# Patient Record
Sex: FEMALE | Race: ASIAN | Employment: OTHER | URBAN - METROPOLITAN AREA
[De-identification: names, ages, dates, MRNs, and addresses within clinical notes are randomized per-mention and may not be internally consistent; named-entity substitution may affect disease eponyms.]

---

## 2017-01-24 DIAGNOSIS — J30.2 SEASONAL ALLERGIC RHINITIS: Primary | ICD-10-CM

## 2017-01-24 RX ORDER — HYDROCORTISONE VALERATE 2 MG/G
OINTMENT TOPICAL
Qty: 90 G | Refills: 1 | Status: SHIPPED | OUTPATIENT
Start: 2017-01-24 | End: 2017-10-09

## 2017-01-24 NOTE — TELEPHONE ENCOUNTER
hydrocortisone valerate (WESTCORT) 0.2 % ointment      Last Written Prescription Date: 11/25/2015  Last Fill Quantity: 90g,  # refills: prn   Last Office Visit with [facility], [facility] or [facility] prescribing provider: 5/04/2016

## 2017-02-02 DIAGNOSIS — J30.2 SEASONAL ALLERGIC RHINITIS: Primary | ICD-10-CM

## 2017-02-02 RX ORDER — LORATADINE 10 MG/1
10 TABLET ORAL DAILY
Qty: 90 TABLET | Refills: 0 | Status: SHIPPED | OUTPATIENT
Start: 2017-02-02 | End: 2017-06-12

## 2017-02-02 NOTE — TELEPHONE ENCOUNTER
risperiDONE (RISPERDAL) 0.5 MG tablet      Last Written Prescription Date: 7/29/2016  Last Fill Quantity: 90,  # refills: 1   Last Office Visit with [facility], [facility] or [facility] prescribing provider: 5/04/2016

## 2017-03-31 ENCOUNTER — MEDICAL CORRESPONDENCE (OUTPATIENT)
Dept: HEALTH INFORMATION MANAGEMENT | Facility: CLINIC | Age: 82
End: 2017-03-31

## 2017-03-31 ENCOUNTER — TELEPHONE (OUTPATIENT)
Dept: INTERNAL MEDICINE | Facility: CLINIC | Age: 82
End: 2017-03-31

## 2017-03-31 NOTE — TELEPHONE ENCOUNTER
Reason for call: Other   Patient called regarding (reason for call): prescription  Additional comments: RX for pull ups sent [phone] active style.    Phone Number Pt can be reached at: Other phone number:    Best Time: anytime  Can we leave a detailed message on this number? NO

## 2017-03-31 NOTE — TELEPHONE ENCOUNTER
Form filled out, placed in PCP's folder to be signed and faxed today.  Jeniffer Srinivasan RN       Formed signed by PCP and faxed  Jeniffer Srinivasan RN

## 2017-06-12 DIAGNOSIS — J30.2 SEASONAL ALLERGIC RHINITIS: ICD-10-CM

## 2017-06-12 NOTE — TELEPHONE ENCOUNTER
loratadine (CLARITIN) 10 MG tablet      Last Written Prescription Date: 2/2/17  Last Fill Quantity: 90,  # refills: 0  Last Office Visit with [facility], [facility] or [facility] prescribing provider: 5/4/16

## 2017-06-13 RX ORDER — LORATADINE 10 MG/1
TABLET ORAL
Qty: 30 TABLET | Refills: 0 | Status: SHIPPED | OUTPATIENT
Start: 2017-06-13 | End: 2017-07-13

## 2017-06-14 DIAGNOSIS — J30.2 SEASONAL ALLERGIC RHINITIS: Primary | ICD-10-CM

## 2017-06-14 DIAGNOSIS — I10 ESSENTIAL HYPERTENSION: ICD-10-CM

## 2017-06-14 RX ORDER — LOSARTAN POTASSIUM AND HYDROCHLOROTHIAZIDE 25; 100 MG/1; MG/1
1 TABLET ORAL DAILY
Qty: 30 TABLET | Refills: 0 | Status: SHIPPED | OUTPATIENT
Start: 2017-06-14 | End: 2017-07-13

## 2017-06-14 RX ORDER — FLUTICASONE PROPIONATE 50 MCG
1-2 SPRAY, SUSPENSION (ML) NASAL DAILY
Qty: 16 G | Refills: 0 | Status: SHIPPED | OUTPATIENT
Start: 2017-06-14 | End: 2017-07-13

## 2017-06-14 NOTE — TELEPHONE ENCOUNTER
fluticasone (FLONASE) 50 MCG/ACT nasal spray      Last Written Prescription Date: 1/27/2016  Last Fill Quantity: 16g,  # refills: 3   Last Office Visit with [facility], [facility] or [facility] prescribing provider: 5/04/2016

## 2017-06-14 NOTE — TELEPHONE ENCOUNTER
losartan-hydrochlorothiazide (HYZAAR) 100-25 MG per tablet      Last Written Prescription Date: 1/27/2016  Last Fill Quantity: 90, # refills: 3  Last Office Visit with [facility], [facility] or [facility] prescribing provider: 5/04/2016       Potassium   Date Value Ref Range Status   05/04/2016 3.6 3.4 - 5.3 mmol/L Final     Creatinine   Date Value Ref Range Status   05/04/2016 0.84 0.52 - 1.04 mg/dL Final     BP Readings from Last 3 Encounters:   05/04/16 124/62   02/13/16 178/80   01/27/16 130/70

## 2017-06-16 DIAGNOSIS — I10 ESSENTIAL HYPERTENSION: ICD-10-CM

## 2017-06-16 DIAGNOSIS — I10 HYPERTENSION GOAL BP (BLOOD PRESSURE) < 140/90: ICD-10-CM

## 2017-06-16 NOTE — TELEPHONE ENCOUNTER
amLODIPine      Last Written Prescription Date: 01/27/16  Last Fill Quantity: 90, # refills: 3    Last Office Visit with [facility] or [facility] prescribing provider:  01/27/16   Future Office Visit:        BP Readings from Last 3 Encounters:   05/04/16 124/62   02/13/16 178/80   01/27/16 130/70     Aspirin 81mg      Last Written Prescription Date: 01/27/2016  Last Fill Quantity: 90, # refills: 3  Last Office Visit with [facility], [facility] or [facility] prescribing provider: 01/27/16        BP Readings from Last 3 Encounters:   05/04/16 124/62   02/13/16 178/80   01/27/16 130/70     Lab Results   Component Value Date    AST 23 04/08/2013     Lab Results   Component Value Date    ALT 16 04/08/2013     Creatinine   Date Value Ref Range Status   05/04/2016 0.84 0.52 - 1.04 mg/dL Final

## 2017-06-20 RX ORDER — AMLODIPINE BESYLATE 5 MG/1
5 TABLET ORAL
Qty: 90 TABLET | Refills: 3 | Status: SHIPPED | OUTPATIENT
Start: 2017-06-20 | End: 2017-07-13

## 2017-06-22 DIAGNOSIS — I10 ESSENTIAL HYPERTENSION: ICD-10-CM

## 2017-06-22 RX ORDER — FLUTICASONE PROPIONATE 50 MCG
1-2 SPRAY, SUSPENSION (ML) NASAL DAILY
Qty: 16 G | Refills: 0 | Status: CANCELLED | OUTPATIENT
Start: 2017-06-22

## 2017-06-22 RX ORDER — LOSARTAN POTASSIUM AND HYDROCHLOROTHIAZIDE 25; 100 MG/1; MG/1
1 TABLET ORAL DAILY
Qty: 30 TABLET | Refills: 0 | Status: CANCELLED | OUTPATIENT
Start: 2017-06-22

## 2017-06-22 RX ORDER — LORATADINE 10 MG/1
TABLET ORAL
Qty: 30 TABLET | Refills: 0 | Status: CANCELLED | OUTPATIENT
Start: 2017-06-22

## 2017-06-22 NOTE — TELEPHONE ENCOUNTER
PATIENT HAS UPCOMING APPT AND IS NEEDING MEDS TO HOLD HER OVER TILL THEM    fluticasone (FLONASE) 50 MCG/ACT spray,  Last Written Prescription Date: 06/14/2017  Last Fill Quantity: 16g, # refills: 0    Last Office Visit with [facility], [facility] or  Health prescribing provider:  05/04/2016   Future Office Visit:    Next 5 appointments (look out 90 days)     Jul 13, 2017  9:30 AM CDT   [facility]   [facility]    [address] [place] [id number]   [phone]                   Date of Last Asthma Action Plan Letter:   There are no preventive care reminders to display for this patient.   Asthma Control Test: No flowsheet data found.    Date of Last Spirometry Test:   No results found for this or any previous visit.       losartan-hydrochlorothiazide (HYZAAR) 100-25 MG per tablet      Last Written Prescription Date: 06/14/2017  Last Fill Quantity: 30,  # refills: 0   Last Office Visit with [facility], [facility] or  Health prescribing provider: 05/04/2016                                         Next 5 appointments (look out 90 days)     Jul 13, 2017  9:30 AM CDT   SHORT with Mario Wilhelm MD   [facility] ([address] [address]   [phone]                    loratadine (CLARITIN) 10 MG tablet       Last Written Prescription Date: 06/13/2017  Last Fill Quantity: 30, # refills: 0  Last Office Visit with [facility], [facility] or  Health prescribing provider: 05/04/2016  Next 5 appointments (look out 90 days)     Jul 13, 2017  9:30 AM CDT   SHORT with [facility] Wilhelm MD   [facility]    [address] [place] [id number] [phone]                   Potassium   Date Value Ref Range Status   05/04/2016 3.6 3.4 - 5.3 mmol/L Final     Creatinine   Date Value Ref Range Status    05/04/2016 0.84 0.52 - 1.04 mg/dL Final     BP Readings from Last 3 Encounters:   05/04/16 124/62   02/13/16 178/80   01/27/16 130/70

## 2017-06-22 NOTE — TELEPHONE ENCOUNTER
Called , Pam, on consent to communicate. Patient was already given refill of these on 6/14, which should be enough to last until upcoming appointment. She will check with patient's son and call back if any issues. Per pharmacy, patient did  these prescriptions on 6/14.

## 2017-07-13 ENCOUNTER — OFFICE VISIT (OUTPATIENT)
Dept: INTERNAL MEDICINE | Facility: CLINIC | Age: 82
End: 2017-07-13
Payer: COMMERCIAL

## 2017-07-13 VITALS
TEMPERATURE: 98.3 F | WEIGHT: 142.3 LBS | HEART RATE: 81 BPM | OXYGEN SATURATION: 99 % | SYSTOLIC BLOOD PRESSURE: 148 MMHG | BODY MASS INDEX: 29.87 KG/M2 | HEIGHT: 58 IN | DIASTOLIC BLOOD PRESSURE: 62 MMHG

## 2017-07-13 DIAGNOSIS — Z00.00 MEDICARE ANNUAL WELLNESS VISIT, INITIAL: Primary | ICD-10-CM

## 2017-07-13 DIAGNOSIS — J30.2 SEASONAL ALLERGIC RHINITIS, UNSPECIFIED CHRONICITY, UNSPECIFIED TRIGGER: ICD-10-CM

## 2017-07-13 DIAGNOSIS — I10 ESSENTIAL HYPERTENSION: ICD-10-CM

## 2017-07-13 DIAGNOSIS — Z23 ENCOUNTER FOR IMMUNIZATION: ICD-10-CM

## 2017-07-13 LAB
ANION GAP SERPL CALCULATED.3IONS-SCNC: 7 MMOL/L (ref 3–14)
BUN SERPL-MCNC: 12 MG/DL (ref 7–30)
CALCIUM SERPL-MCNC: 9.6 MG/DL (ref 8.5–10.1)
CHLORIDE SERPL-SCNC: 110 MMOL/L (ref 94–109)
CO2 SERPL-SCNC: 27 MMOL/L (ref 20–32)
CREAT SERPL-MCNC: 0.7 MG/DL (ref 0.52–1.04)
GFR SERPL CREATININE-BSD FRML MDRD: 78 ML/MIN/1.7M2
GLUCOSE SERPL-MCNC: 101 MG/DL (ref 70–99)
HGB BLD-MCNC: 7.6 G/DL (ref 11.7–15.7)
POTASSIUM SERPL-SCNC: 4.4 MMOL/L (ref 3.4–5.3)
SODIUM SERPL-SCNC: 144 MMOL/L (ref 133–144)

## 2017-07-13 PROCEDURE — 80048 BASIC METABOLIC PNL TOTAL CA: CPT | Performed by: INTERNAL MEDICINE

## 2017-07-13 PROCEDURE — 85018 HEMOGLOBIN: CPT | Performed by: INTERNAL MEDICINE

## 2017-07-13 PROCEDURE — 36415 COLL VENOUS BLD VENIPUNCTURE: CPT | Performed by: INTERNAL MEDICINE

## 2017-07-13 PROCEDURE — G0438 PPPS, INITIAL VISIT: HCPCS | Mod: 25 | Performed by: INTERNAL MEDICINE

## 2017-07-13 PROCEDURE — 90670 PCV13 VACCINE IM: CPT | Performed by: INTERNAL MEDICINE

## 2017-07-13 PROCEDURE — G0009 ADMIN PNEUMOCOCCAL VACCINE: HCPCS | Performed by: INTERNAL MEDICINE

## 2017-07-13 RX ORDER — LOSARTAN POTASSIUM AND HYDROCHLOROTHIAZIDE 25; 100 MG/1; MG/1
1 TABLET ORAL DAILY
Qty: 90 TABLET | Refills: 3 | Status: SHIPPED | OUTPATIENT
Start: 2017-07-13 | End: 2018-08-08

## 2017-07-13 RX ORDER — FLUTICASONE PROPIONATE 50 MCG
1-2 SPRAY, SUSPENSION (ML) NASAL DAILY
Qty: 16 G | Refills: 11 | Status: SHIPPED | OUTPATIENT
Start: 2017-07-13 | End: 2018-08-08

## 2017-07-13 RX ORDER — AMLODIPINE BESYLATE 5 MG/1
5 TABLET ORAL
Qty: 90 TABLET | Refills: 3 | Status: SHIPPED | OUTPATIENT
Start: 2017-07-13 | End: 2019-04-01

## 2017-07-13 RX ORDER — LORATADINE 10 MG/1
TABLET ORAL
Qty: 90 TABLET | Refills: 3 | Status: SHIPPED | OUTPATIENT
Start: 2017-07-13 | End: 2018-08-08

## 2017-07-13 NOTE — NURSING NOTE
"Chief Complaint   Patient presents with     Physical       Initial /62  Pulse 81  Temp 98.3  F (36.8  C) (Oral)  Ht 4' 10\" (1.473 m)  Wt 142 lb 4.8 oz (64.5 kg)  SpO2 99%  BMI 29.74 kg/m2 Estimated body mass index is 29.74 kg/(m^2) as calculated from the following:    Height as of this encounter: 4' 10\" (1.473 m).    Weight as of this encounter: 142 lb 4.8 oz (64.5 kg).  Medication Reconciliation: complete   Leidy Huitron MA   "

## 2017-07-13 NOTE — LETTER
[address]  [address]  [phone]          Margot Kenyon  [address]  [facility] [address]      7/14/2017        Dear MsJay Jay Kenyon:    I am writing to inform you of the results of the laboratory tests you had done recently.    The results of your recent labs are as noted.   Kidney function:   NORMAL  Hemoglobin:   ABNORMAL slightly decreased from before     Please follow-up with me in the next few weeks to discuss your reduced hemoglobin.      Thank you for allowing me to participate in your care. If you have any further questions or problems, please contact me via our nurse line at [phone].    Sincerely,          Britton Wilhelm M.D.  Department of Internal Medicine  [facility]

## 2017-07-13 NOTE — PROGRESS NOTES
SUBJECTIVE:   Margot Kenyon is a 89 year old female who presents for Preventive Visit.    Are you in the first 12 months of your Medicare Part B coverage?  No    Healthy Habits:    Do you get at least three servings of calcium containing foods daily (dairy, green leafy vegetables, etc.)?  no, taking calcium and/or vitamin D supplement: yes - Tums    Amount of exercise or daily activities, outside of work: none    Problems taking medications regularly No    Medication side effects: No    Have you had an eye exam in the past two years? yes    Do you see a dentist twice per year? No, has dentures     Do you have sleep apnea, excessive snoring or daytime drowsiness?no    COGNITIVE SCREEN-refused per son-patient not able to do             Reviewed and updated as needed this visit by clinical staff  Tobacco  Allergies  Meds  Problems         Reviewed and updated as needed this visit by Provider  Allergies  Meds  Problems        Social History   Substance Use Topics     Smoking status: Never Smoker     Smokeless tobacco: Never Used     Alcohol use No       The patient does not drink >3 drinks per day nor >7 drinks per week.    Today's PHQ-2 Score:   PHQ-2 ( 1999 Pf[employer]) 7/13/2017 1/27/2016   Q1: Little interest or pleasure in doing things 0 0   Q2: Feeling down, depressed or hopeless 0 0   PHQ-2 Score 0 0       Do you feel safe in your environment - YES    Do you have a Health Care Directive?: No: Advance care planning reviewed with patient; information given to patient to review.    Current providers sharing in care for this patient include:   Patient Care Team:  Mario Wilhelm MD as PCP - General (Internal Medicine)      Hearing impairment: Yes, Feel that people are mumbling or not speaking clearly.    Need to ask people to speak up or repeat themselves.    Ability to successfully perform activities of daily living: No, needs assistance with: transportation, preparing meals, housework, bathing and medications  "    Fall risk:  Fallen 2 or more times in the past year?: No  Any fall with injury in the past year?: No    Home safety:  none identified      The following health maintenance items are reviewed in Epic and correct as of today:  Health Maintenance   Topic Date Due     PNEUMOCOCCAL (2 of 2 - PCV13) 05/26/2012     DEXA Q3 YR  07/27/2013     FALL RISK ASSESSMENT  01/27/2017     INFLUENZA VACCINE (SYSTEM ASSIGNED)  09/01/2017     ADVANCE DIRECTIVE PLANNING Q5 YRS  11/13/2019     TETANUS IMMUNIZATION (SYSTEM ASSIGNED)  05/26/2021         Pneumonia Vaccine:Adults age 65+ who received Pneumovax (PPSV23) at 65 years or older: Should be given PCV13 > 1 year after their most recent PPSV23      Patient continues on amlodipine and losartan/HCTZ for hypertension.  Today's blood pressure noted, apart from mild dry mouth she seems to tolerate the medications without difficulty.  Continues on loratadine and occasional nasal steroid spray, needs refills of medications.    ROS:  Constitutional, HEENT, cardiovascular, pulmonary, GI, , musculoskeletal, neuro, skin, endocrine and psych systems are negative, except as otherwise noted.    OBJECTIVE:   /62  Pulse 81  Temp 98.3  F (36.8  C) (Oral)  Ht 4' 10\" (1.473 m)  Wt 142 lb 4.8 oz (64.5 kg)  SpO2 99%  BMI 29.74 kg/m2 Estimated body mass index is 29.74 kg/(m^2) as calculated from the following:    Height as of this encounter: 4' 10\" (1.473 m).    Weight as of this encounter: 142 lb 4.8 oz (64.5 kg).  EXAM:   GENERAL: healthy, alert and no distress  NECK: no adenopathy, no asymmetry, masses, or scars and thyroid normal to palpation  RESP: lungs clear to auscultation - no rales, rhonchi or wheezes  CV: regular rate and rhythm, normal S1 S2, no S3 or S4, no murmur, click or rub, no peripheral edema and peripheral pulses strong  ABDOMEN: soft, nontender, no hepatosplenomegaly, no masses and bowel sounds normal  MS: no gross musculoskeletal defects noted, no edema    ASSESSMENT " "/ PLAN:   1. Medicare annual wellness visit, initial      2. Essential hypertension  Reasonably well-controlled, check basic surveillance labs today  - amLODIPine (NORVASC) 5 MG tablet; Take 1 tablet (5 mg) by mouth daily (with breakfast)  Dispense: 90 tablet; Refill: 3  - losartan-hydrochlorothiazide (HYZAAR) 100-25 MG per tablet; Take 1 tablet by mouth daily  Dispense: 90 tablet; Refill: 3  - Basic metabolic panel  (Ca, Cl, CO2, Creat, Gluc, K, Na, BUN)  - Hemoglobin    3. Seasonal allergic rhinitis, unspecified chronicity, unspecified trigger  Meds refilled as ordered  - fluticasone (FLONASE) 50 MCG/ACT spray; Spray 1-2 sprays into both nostrils daily  Dispense: 16 g; Refill: 11  - loratadine (CLARITIN) 10 MG tablet; TAKE 1 TABLET(10 MG) BY MOUTH DAILY  Dispense: 90 tablet; Refill: 3    4. Encounter for immunization    - Pneumococcal vaccine 13 valent PCV13 IM (Prevnar) [[id number]]  - ADMIN MEDICARE: Pneumococcal Vaccine ()    End of Life Planning:  Patient currently has an advanced directive: No.  I have verified the patient's ablity to prepare an advanced directive/make health care decisions.  Literature was provided to assist patient in preparing an advanced directive.    COUNSELING:  Reviewed preventive health counseling, as reflected in patient instructions        Estimated body mass index is 29.74 kg/(m^2) as calculated from the following:    Height as of this encounter: 4' 10\" (1.473 m).    Weight as of this encounter: 142 lb 4.8 oz (64.5 kg).     reports that she has never smoked. She has never used smokeless tobacco.      Appropriate preventive services were discussed with this patient, including applicable screening as appropriate for cardiovascular disease, diabetes, osteopenia/osteoporosis, and glaucoma.  As appropriate for age/gender, discussed screening for colorectal cancer, prostate cancer, breast cancer, and cervical cancer. Checklist reviewing preventive services available has been given to the " patient.    Reviewed patients plan of care and provided an AVS. The Basic Care Plan (routine screening as documented in Health Maintenance) for So meets the Care Plan requirement. This Care Plan has been established and reviewed with the Patient.    Counseling Resources:  ATP IV Guidelines  Pooled Cohorts Equation Calculator  Breast Cancer Risk Calculator  FRAX Risk Assessment  ICSI Preventive Guidelines  Dietary Guidelines for Americans, 2010  USDA's MyPlate  ASA Prophylaxis  Lung CA Screening    Mario Wilhelm MD  [facility]

## 2017-07-13 NOTE — MR AVS SNAPSHOT
After Visit Summary   7/13/2017    Margot Kenyon    MRN: [id number]           Patient Information     Date Of Birth          8/20/1920        Visit Information        Provider Department      7/13/2017 9:15 AM Mario Wilhelm MD; [employer] [facility]        Today's Diagnoses     Medicare annual wellness visit, initial    -  1    Essential hypertension        Seasonal allergic rhinitis, unspecified chronicity, unspecified trigger        Encounter for immunization          Care Instructions      Preventive Health Recommendations    Female Ages 65 +    Yearly exam:     See your health care provider every year in order to  o Review health changes.   o Discuss preventive care.    o Review your medicines if your doctor has prescribed any.      You no longer need a yearly Pap test unless you've had an abnormal Pap test in the past 10 years. If you have vaginal symptoms, such as bleeding or discharge, be sure to talk with your provider about a Pap test.      Every 1 to 2 years, have a mammogram.  If you are over 69, talk with your health care provider about whether or not you want to continue having screening mammograms.      Every 10 years, have a colonoscopy. Or, have a yearly FIT test (stool test). These exams will check for colon cancer.       Have a cholesterol test every 5 years, or more often if your doctor advises it.       Have a diabetes test (fasting glucose) every three years. If you are at risk for diabetes, you should have this test more often.       At age 65, have a bone density scan (DEXA) to check for osteoporosis (brittle bone disease).    Shots:    Get a flu shot each year.    Get a tetanus shot every 10 years.    Talk to your doctor about your pneumonia vaccines. There are now two you should receive - Pneumovax (PPSV 23) and Prevnar (PCV 13).    Talk to your doctor about the shingles vaccine.    Talk to your doctor about the hepatitis B  "vaccine.    Nutrition:     Eat at least 5 servings of fruits and vegetables each day.      Eat whole-grain bread, whole-wheat pasta and brown rice instead of white grains and rice.      Talk to your provider about Calcium and Vitamin D.     Lifestyle    Exercise at least 150 minutes a week (30 minutes a day, 5 days a week). This will help you control your weight and prevent disease.      Limit alcohol to one drink per day.      No smoking.       Wear sunscreen to prevent skin cancer.       See your dentist twice a year for an exam and cleaning.      See your eye doctor every 1 to 2 years to screen for conditions such as glaucoma, macular degeneration and cataracts.          Follow-ups after your visit        Who to contact     If you have questions or need follow up information about today's clinic visit or your schedule please contact [facility] directly at [phone].  Normal or non-critical lab and imaging results will be communicated to you by Wheelyhart, letter or phone within 4 business days after the clinic has received the results. If you do not hear from us within 7 days, please contact the clinic through [employer] or phone. If you have a critical or abnormal lab result, we will notify you by phone as soon as possible.  Submit refill requests through [employer] or call your pharmacy and they will forward the refill request to us. Please allow 3 business days for your refill to be completed.          Additional Information About Your Visit        [employer] Information     [employer] lets you send messages to your doctor, view your test results, renew your prescriptions, schedule appointments and more. To sign up, go to [email or web address] . Click on \"Log in\" on the left side of the screen, which will take you to the Welcome page. Then click on \"Sign up Now\" on the right side of the page.     You will be asked to enter the access code listed below, as well as some personal information. " "Please follow the directions to create your username and password.     Your access code is: [email or web address]  Expires: 10/11/2017 10:03 AM     Your access code will  in 90 days. If you need help or a new code, please call your [facility] clinic or [phone].        Care EveryWhere ID     This is your Care EveryWhere ID. This could be used by other organizations to access your [facility] medical records  [id number]        Your Vitals Were     Pulse Temperature Height Pulse Oximetry BMI (Body Mass Index)       81 98.3  F (36.8  C) (Oral) 4' 10\" (1.473 m) 99% 29.74 kg/m2        Blood Pressure from Last 3 Encounters:   17 148/62   16 124/62   16 178/80    Weight from Last 3 Encounters:   17 142 lb 4.8 oz (64.5 kg)   16 137 lb (62.1 kg)   16 135 lb (61.2 kg)              We Performed the Following     ADMIN MEDICARE: Pneumococcal Vaccine ()     Basic metabolic panel  (Ca, Cl, CO2, Creat, Gluc, K, Na, BUN)     Hemoglobin     Pneumococcal vaccine 13 valent PCV13 IM (Prevnar) [[id number]]          Today's Medication Changes          These changes are accurate as of: 17 10:03 AM.  If you have any questions, ask your nurse or doctor.               These medicines have changed or have updated prescriptions.        Dose/Directions    loratadine 10 MG tablet   Commonly known as:  CLARITIN   This may have changed:  See the new instructions.   Used for:  Seasonal allergic rhinitis, unspecified chronicity, unspecified trigger   Changed by:  Mario Wilhelm MD        TAKE 1 TABLET(10 MG) BY MOUTH DAILY   Quantity:  90 tablet   Refills:  3         Stop taking these medicines if you haven't already. Please contact your care team if you have questions.     meclizine 25 MG tablet   Commonly known as:  ANTIVERT   Stopped by:  Mario Wilhelm MD                Where to get your medicines      These medications were sent to [facility] [id number] [place] [address] [address] [facility] [address]  [address], [facility] [address]    Hours:  24-hours Phone:  [phone]     amLODIPine 5 MG tablet    fluticasone 50 MCG/ACT spray    loratadine 10 MG tablet    losartan-hydrochlorothiazide 100-25 MG per tablet                Primary Care Provider Office Phone # Fax #    Mario Britton Wilhelm [phone]       [facility] [address]  [facility] [address]        Equal Access to Services     ART SHARP : Hadii aad ku hadasho Soomaali, waaxda luqadaha, qaybta kaalmada adeegyada, waxay idiin hayaan adeeg kharameek ladeyanira saavedra. So [facility] [phone].    ATENCIÓN: Si habla español, tiene a diaz disposición servicios gratuitos de asistencia lingüística. [facility] [phone].    We comply with applicable federal civil rights laws and [place] laws. We do not discriminate on the basis of race, color, national origin, age, disability sex, sexual orientation or gender identity.            Thank you!     Thank you for choosing [facility]  for your care. Our goal is always to provide you with excellent care. Hearing back from our patients is one way we can continue to improve our services. Please take a few minutes to complete the written survey that you may receive in the mail after your visit with us. Thank you!             Your Updated Medication List - Protect others around you: Learn how to safely use, store and throw away your medicines at www.disposemymeds.org.          This list is accurate as of: 7/13/17 10:03 AM.  Always use your most recent med list.                   Brand Name Dispense Instructions for use Diagnosis    amLODIPine 5 MG tablet    NORVASC    90 tablet    Take 1 tablet (5 mg) by mouth daily (with breakfast)    Essential hypertension       aspirin 81 MG tablet     100 tablet    Take 1 tablet (81 mg) by mouth daily    Hypertension goal BP (blood pressure) < 140/90       capsaicin 0.075 % cream    TRIXAICIN HP     180 g    Apply topically 4 times daily    Pain in joint, multiple sites       fluticasone 50 MCG/ACT spray    FLONASE    16 g    Spray 1-2 sprays into both nostrils daily    Seasonal allergic rhinitis, unspecified chronicity, unspecified trigger       hydrocortisone valerate 0.2 % ointment    WESTCORT    90 g    Apply  topically 2 times daily.    Seasonal allergic rhinitis       loratadine 10 MG tablet    CLARITIN    90 tablet    TAKE 1 TABLET(10 MG) BY MOUTH DAILY    Seasonal allergic rhinitis, unspecified chronicity, unspecified trigger       losartan-hydrochlorothiazide 100-25 MG per tablet    HYZAAR    90 tablet    Take 1 tablet by mouth daily    Essential hypertension

## 2017-07-13 NOTE — LETTER
[address]  [address]  [phone]          Margot Kenyon  [address]  [facility] [address]      7/14/2017        Dear Ms. Kenyon:    I am writing to inform you of the results of the laboratory tests you had done recently.    The results of your recent labs are as noted.   {KTW LAB RESULT:[id number]}      Thank you for allowing me to participate in your care. If you have any further questions or problems, please contact me via our nurse line at [phone].    Sincerely,          Britton Wilhelm M.D.  Department of Internal Medicine  [facility]      {:[id number]}

## 2017-09-13 ENCOUNTER — OFFICE VISIT (OUTPATIENT)
Dept: INTERNAL MEDICINE | Facility: CLINIC | Age: 82
End: 2017-09-13
Payer: COMMERCIAL

## 2017-09-13 VITALS
DIASTOLIC BLOOD PRESSURE: 66 MMHG | BODY MASS INDEX: 29.41 KG/M2 | OXYGEN SATURATION: 97 % | WEIGHT: 140.1 LBS | HEART RATE: 79 BPM | HEIGHT: 58 IN | TEMPERATURE: 97.7 F | SYSTOLIC BLOOD PRESSURE: 148 MMHG

## 2017-09-13 DIAGNOSIS — D64.9 ANEMIA, UNSPECIFIED TYPE: Primary | ICD-10-CM

## 2017-09-13 DIAGNOSIS — I10 ESSENTIAL HYPERTENSION: ICD-10-CM

## 2017-09-13 LAB
BASOPHILS # BLD AUTO: 0 10E9/L (ref 0–0.2)
BASOPHILS NFR BLD AUTO: 0.2 %
DIFFERENTIAL METHOD BLD: ABNORMAL
EOSINOPHIL # BLD AUTO: 0.2 10E9/L (ref 0–0.7)
EOSINOPHIL NFR BLD AUTO: 4.8 %
ERYTHROCYTE [DISTWIDTH] IN BLOOD BY AUTOMATED COUNT: 17.6 % (ref 10–15)
FERRITIN SERPL-MCNC: 8 NG/ML (ref 8–252)
FOLATE SERPL-MCNC: 15.4 NG/ML
HCT VFR BLD AUTO: 28.9 % (ref 35–47)
HGB BLD-MCNC: 7.7 G/DL (ref 11.7–15.7)
IRON SATN MFR SERPL: 4 % (ref 15–46)
IRON SERPL-MCNC: 20 UG/DL (ref 35–180)
LYMPHOCYTES # BLD AUTO: 0.8 10E9/L (ref 0.8–5.3)
LYMPHOCYTES NFR BLD AUTO: 16.7 %
MCH RBC QN AUTO: 22.4 PG (ref 26.5–33)
MCHC RBC AUTO-ENTMCNC: 26.6 G/DL (ref 31.5–36.5)
MCV RBC AUTO: 84 FL (ref 78–100)
MONOCYTES # BLD AUTO: 0.5 10E9/L (ref 0–1.3)
MONOCYTES NFR BLD AUTO: 11.3 %
NEUTROPHILS # BLD AUTO: 3.1 10E9/L (ref 1.6–8.3)
NEUTROPHILS NFR BLD AUTO: 67 %
PLATELET # BLD AUTO: 271 10E9/L (ref 150–450)
RBC # BLD AUTO: 3.43 10E12/L (ref 3.8–5.2)
TIBC SERPL-MCNC: 456 UG/DL (ref 240–430)
VIT B12 SERPL-MCNC: 256 PG/ML (ref 193–986)
WBC # BLD AUTO: 4.6 10E9/L (ref 4–11)

## 2017-09-13 PROCEDURE — 36415 COLL VENOUS BLD VENIPUNCTURE: CPT | Performed by: INTERNAL MEDICINE

## 2017-09-13 PROCEDURE — 83550 IRON BINDING TEST: CPT | Performed by: INTERNAL MEDICINE

## 2017-09-13 PROCEDURE — 82746 ASSAY OF FOLIC ACID SERUM: CPT | Performed by: INTERNAL MEDICINE

## 2017-09-13 PROCEDURE — 82607 VITAMIN B-12: CPT | Performed by: INTERNAL MEDICINE

## 2017-09-13 PROCEDURE — 83540 ASSAY OF IRON: CPT | Performed by: INTERNAL MEDICINE

## 2017-09-13 PROCEDURE — 82728 ASSAY OF FERRITIN: CPT | Performed by: INTERNAL MEDICINE

## 2017-09-13 PROCEDURE — 99214 OFFICE O/P EST MOD 30 MIN: CPT | Performed by: INTERNAL MEDICINE

## 2017-09-13 PROCEDURE — 85025 COMPLETE CBC W/AUTO DIFF WBC: CPT | Performed by: INTERNAL MEDICINE

## 2017-09-13 NOTE — PROGRESS NOTES
"  SUBJECTIVE:   So GLO Kenyon is a 89 year old female who presents to clinic today for the following health issues:      Pt is here today with son and  to discuss labs from 07/13. No others concerns. Pt already had flu shot at adult .         Problem list and histories reviewed & adjusted, as indicated.  Additional history: as documented    Screening labs from wellness exam 2 months ago remarkable for hemoglobin of 7.6.  Was 11.5 as of November 2014.    Patient does endorse mild fatigue, and has some age-related physical debility, but otherwise is in excellent shape given her age.  She denies any significantly worsening shortness of breath, denies obvious hematochezia or melena.  She does not take any iron or vitamin supplementation, does take a baby aspirin every day.    On direct questioning, she would not be interested in GI endoscopic evaluation of this issue.    Reviewed and updated as needed this visit by clinical staff  Tobacco  Allergies       Reviewed and updated as needed this visit by Provider         ROS:  Constitutional, HEENT, cardiovascular, pulmonary, GI, , musculoskeletal, neuro, skin, endocrine and psych systems are negative, except as otherwise noted.      OBJECTIVE:   /66 (BP Location: Right arm, Patient Position: Chair, Cuff Size: Adult Regular)  Pulse 79  Temp 97.7  F (36.5  C) (Oral)  Ht 4' 10\" (1.473 m)  Wt 140 lb 1.6 oz (63.5 kg)  SpO2 97%  BMI 29.28 kg/m2  Body mass index is 29.28 kg/(m^2).  GENERAL: healthy, alert and no distress  NECK: no adenopathy, no asymmetry, masses, or scars and thyroid normal to palpation  RESP: lungs clear to auscultation - no rales, rhonchi or wheezes  CV: regular rate and rhythm, normal S1 S2, no S3 or S4, no murmur, click or rub, no peripheral edema and peripheral pulses strong  ABDOMEN: soft, nontender, no hepatosplenomegaly, no masses and bowel sounds normal  MS: no gross musculoskeletal defects noted, no " edema        ASSESSMENT/PLAN:     1. Anemia, unspecified type  Unclear etiology at this point.  No history to suggest ongoing rapid blood loss.  Will check labs as ordered, start iron replacement if necessary.  As above, patient and her son are not interested in pursuing GI endoscopic evaluation.  - CBC with platelets and differential  - Iron and iron binding capacity  - Ferritin  - Vitamin B12  - Folate    2. Essential hypertension  Stable.  Continue antihypertensives as currently.          Mario Wilhelm MD  [facility]

## 2017-09-13 NOTE — LETTER
9/28/2017         Margot Kenyon  [address]  [place] [facility] [place] [address]            Dear Ms. Kenyon,    I am writing to inform you of the lab tests you had performed recently.      Your lab results are as follows:    Hemoglobin: ABNORMAL - still low  Iron Levels: LOW    Please follow-up with me in the next few weeks to discuss this further.    Thank you for allowing me to participate in your care.  If you have further questions, please contact us at [phone].      Sincerely,        HIGINIO Wilhelm MD  Dept. of Internal Medicine  [facility]

## 2017-09-13 NOTE — NURSING NOTE
"Chief Complaint   Patient presents with     Results     from 07/13       Initial /66 (BP Location: Right arm, Patient Position: Chair, Cuff Size: Adult Regular)  Pulse 79  Temp 97.7  F (36.5  C) (Oral)  Ht 4' 10\" (1.473 m)  Wt 140 lb 1.6 oz (63.5 kg)  SpO2 97%  BMI 29.28 kg/m2 Estimated body mass index is 29.28 kg/(m^2) as calculated from the following:    Height as of this encounter: 4' 10\" (1.473 m).    Weight as of this encounter: 140 lb 1.6 oz (63.5 kg).  Medication Reconciliation: complete    "

## 2017-09-13 NOTE — MR AVS SNAPSHOT
"              After Visit Summary   2017    Margot Kenyon    MRN: [id number]           Patient Information     Date Of Birth          1920        Visit Information        Provider Department      2017 8:00 AM Mario Wilhelm MD; [employer] [facility]        Today's Diagnoses     Anemia, unspecified type    -  1    Essential hypertension           Follow-ups after your visit        Who to contact     If you have questions or need follow up information about today's clinic visit or your schedule please contact [facility] directly at [phone].  Normal or non-critical lab and imaging results will be communicated to you by MyChart, letter or phone within 4 business days after the clinic has received the results. If you do not hear from us within 7 days, please contact the clinic through [employer] or phone. If you have a critical or abnormal lab result, we will notify you by phone as soon as possible.  Submit refill requests through [facility] or call your pharmacy and they will forward the refill request to us. Please allow 3 business days for your refill to be completed.          Additional Information About Your Visit        MyChart Information     [facility] lets you send messages to your doctor, view your test results, renew your prescriptions, schedule appointments and more. To sign up, go to [email or web address] . Click on \"Log in\" on the left side of the screen, which will take you to the Welcome page. Then click on \"Sign up Now\" on the right side of the page.     You will be asked to enter the access code listed below, as well as some personal information. Please follow the directions to create your username and password.     Your access code is: [email or web address]  Expires: 10/11/2017 10:03 AM     Your access code will  in 90 days. If you need help or a new code, please call your [facility] or [phone].        Care " "EveryWhere ID     This is your Care EveryWhere ID. This could be used by other organizations to access your [place] medical records  [id number]        Your Vitals Were     Pulse Temperature Height Pulse Oximetry BMI (Body Mass Index)       79 97.7  F (36.5  C) (Oral) 4' 10\" (1.473 m) 97% 29.28 kg/m2        Blood Pressure from Last 3 Encounters:   09/13/17 148/66   07/13/17 148/62   05/04/16 124/62    Weight from Last 3 Encounters:   09/13/17 140 lb 1.6 oz (63.5 kg)   07/13/17 142 lb 4.8 oz (64.5 kg)   05/04/16 137 lb (62.1 kg)              We Performed the Following     CBC with platelets and differential     Ferritin     Folate     Iron and iron binding capacity     Vitamin B12        Primary Care Provider Office Phone # Fax #    Mario Wilhelm [phone]       [address] [address]        Equal Access to Services     ART SHARP : Hadii aad ku hadasho Soomaali, waaxda luqadaha, qaybta kaalmada adeegyada, waxay idiin hayaan radha hernadez . So [facility] [phone].    ATENCIÓN: Si habla español, tiene a diaz disposición servicios gratuitos de asistencia lingüística. [facility] [phone].    We comply with applicable federal civil rights laws and [place] laws. We do not discriminate on the basis of race, color, national origin, age, disability sex, sexual orientation or gender identity.            Thank you!     Thank you for choosing [facility]  for your care. Our goal is always to provide you with excellent care. Hearing back from our patients is one way we can continue to improve our services. Please take a few minutes to complete the written survey that you may receive in the mail after your visit with us. Thank you!             Your Updated Medication List - Protect others around you: Learn how to safely use, store and throw away your medicines at www.disposemymeds.org.          This list is accurate as of: 9/13/17  8:37 AM.  Always use " your most recent med list.                   Brand Name Dispense Instructions for use Diagnosis    amLODIPine 5 MG tablet    NORVASC    90 tablet    Take 1 tablet (5 mg) by mouth daily (with breakfast)    Essential hypertension       aspirin 81 MG tablet     100 tablet    Take 1 tablet (81 mg) by mouth daily    Hypertension goal BP (blood pressure) < 140/90       capsaicin 0.075 % cream    TRIXAICIN HP    180 g    Apply topically 4 times daily    Pain in joint, multiple sites       fluticasone 50 MCG/ACT spray    FLONASE    16 g    Spray 1-2 sprays into both nostrils daily    Seasonal allergic rhinitis, unspecified chronicity, unspecified trigger       hydrocortisone valerate 0.2 % ointment    WESTCORT    90 g    Apply  topically 2 times daily.    Seasonal allergic rhinitis       loratadine 10 MG tablet    CLARITIN    90 tablet    TAKE 1 TABLET(10 MG) BY MOUTH DAILY    Seasonal allergic rhinitis, unspecified chronicity, unspecified trigger       losartan-hydrochlorothiazide 100-25 MG per tablet    HYZAAR    90 tablet    Take 1 tablet by mouth daily    Essential hypertension

## 2017-10-09 DIAGNOSIS — J30.2 SEASONAL ALLERGIC RHINITIS: ICD-10-CM

## 2017-10-10 RX ORDER — HYDROCORTISONE VALERATE 2 MG/G
OINTMENT TOPICAL
Qty: 90 G | Refills: 11 | Status: SHIPPED | OUTPATIENT
Start: 2017-10-10 | End: 2018-11-30

## 2017-10-16 ENCOUNTER — OFFICE VISIT (OUTPATIENT)
Dept: INTERNAL MEDICINE | Facility: CLINIC | Age: 82
End: 2017-10-16
Payer: COMMERCIAL

## 2017-10-16 VITALS
DIASTOLIC BLOOD PRESSURE: 74 MMHG | TEMPERATURE: 97.7 F | HEART RATE: 84 BPM | OXYGEN SATURATION: 98 % | SYSTOLIC BLOOD PRESSURE: 152 MMHG | RESPIRATION RATE: 20 BRPM | BODY MASS INDEX: 30.1 KG/M2 | WEIGHT: 144 LBS

## 2017-10-16 DIAGNOSIS — D50.9 IRON DEFICIENCY ANEMIA, UNSPECIFIED IRON DEFICIENCY ANEMIA TYPE: Primary | ICD-10-CM

## 2017-10-16 DIAGNOSIS — M25.50 PAIN IN JOINT, MULTIPLE SITES: ICD-10-CM

## 2017-10-16 PROCEDURE — 99213 OFFICE O/P EST LOW 20 MIN: CPT | Performed by: INTERNAL MEDICINE

## 2017-10-16 RX ORDER — CAPSAICIN 0.75 MG/G
CREAM TOPICAL 4 TIMES DAILY
Qty: 180 G | Status: SHIPPED | OUTPATIENT
Start: 2017-10-16 | End: 2019-04-01

## 2017-10-16 NOTE — MR AVS SNAPSHOT
After Visit Summary   10/16/2017    Margot Kenyon    MRN: [id number]           Patient Information     Date Of Birth          8/20/1920        Visit Information        Provider Department      10/16/2017 9:30 AM Mario Wilhelm MD; [employer] [facility]        Today's Diagnoses     Iron deficiency anemia, unspecified iron deficiency anemia type    -  1    Pain in joint, multiple sites          Care Instructions    - Start taking Vitron-C (iron supplement) twice daily.  (Prescription has been sent to your pharmacy)    - Please follow-up with me in clinic in 2 months.  - Please come in for non-fasting labs, a few days prior to the appointment with me.  You may schedule appointments at our , or by calling [phone].            Follow-ups after your visit        Future tests that were ordered for you today     Open Future Orders        Priority Expected Expires Ordered    CBC with platelets Routine 12/16/2017 2/16/2018 10/16/2017    Iron and iron binding capacity Routine 12/16/2017 2/16/2018 10/16/2017    Ferritin Routine 12/16/2017 2/16/2018 10/16/2017            Who to contact     If you have questions or need follow up information about today's clinic visit or your schedule please contact [facility] directly at [phone].  Normal or non-critical lab and imaging results will be communicated to you by MyChart, letter or phone within 4 business days after the clinic has received the results. If you do not hear from us within 7 days, please contact the clinic through MyChart or phone. If you have a critical or abnormal lab result, we will notify you by phone as soon as possible.  Submit refill requests through [employer] or call your pharmacy and they will forward the refill request to us. Please allow 3 business days for your refill to be completed.          Additional Information About Your Visit        MyChart  "Information     [employer] lets you send messages to your doctor, view your test results, renew your prescriptions, schedule appointments and more. To sign up, go to [email or web address] . Click on \"Log in\" on the left side of the screen, which will take you to the Welcome page. Then click on \"Sign up Now\" on the right side of the page.     You will be asked to enter the access code listed below, as well as some personal information. Please follow the directions to create your username and password.     Your access code is: [email or web address]  Expires: 2018 10:13 AM     Your access code will  in 90 days. If you need help or a new code, please call your [place] clinic or [phone].        Care EveryWhere ID     This is your Care EveryWhere ID. This could be used by other organizations to access your [place] medical records  [id number]        Your Vitals Were     Pulse Temperature Respirations Pulse Oximetry BMI (Body Mass Index)       84 97.7  F (36.5  C) (Oral) 20 98% 30.1 kg/m2        Blood Pressure from Last 3 Encounters:   10/16/17 152/74   17 148/66   17 148/62    Weight from Last 3 Encounters:   10/16/17 144 lb (65.3 kg)   17 140 lb 1.6 oz (63.5 kg)   17 142 lb 4.8 oz (64.5 kg)                 Today's Medication Changes          These changes are accurate as of: 10/16/17 10:13 AM.  If you have any questions, ask your nurse or doctor.               Start taking these medicines.        Dose/Directions    ferrous fumarate 65 mg ([place]. FE)-Vitamin C 125 mg  MG Tabs tablet   Commonly known as:  VITRON-C   Used for:  Iron deficiency anemia, unspecified iron deficiency anemia type   Started by:  Mario Wilhelm MD        Dose:  1 tablet   Take 1 tablet by mouth 2 times daily   Quantity:  60 tablet   Refills:  3            Where to get your medicines      These medications were sent to [facility] [address] [facility] [address] AT [facility]  " [address] [facility] [address]     Phone:  [phone]     capsaicin 0.075 % cream    ferrous fumarate 65 mg ([place]. FE)-Vitamin C 125 mg  MG Tabs tablet                Primary Care Provider Office Phone # Fax #    Mario Wilhelm [phone]       [address] [place] [address]        Equal Access to Services     ART SHARP : Hadii aad ku hadasho Soomaali, waaxda luqadaha, qaybta kaalmada adeegyada, waxay idiin hayaan adeeg kharash la'sheila ah. So [facility] [phone].    ATENCIÓN: Si justinla espkem, tiene a diaz disposición servicios gratuitos de asistencia lingüística. [facility] [phone].    We comply with applicable federal civil rights laws and [place] laws. We do not discriminate on the basis of race, color, national origin, age, disability, sex, sexual orientation, or gender identity.            Thank you!     Thank you for choosing [facility]  for your care. Our goal is always to provide you with excellent care. Hearing back from our patients is one way we can continue to improve our services. Please take a few minutes to complete the written survey that you may receive in the mail after your visit with us. Thank you!             Your Updated Medication List - Protect others around you: Learn how to safely use, store and throw away your medicines at www.disposemymeds.org.          This list is accurate as of: 10/16/17 10:13 AM.  Always use your most recent med list.                   Brand Name Dispense Instructions for use Diagnosis    amLODIPine 5 MG tablet    NORVASC    90 tablet    Take 1 tablet (5 mg) by mouth daily (with breakfast)    Essential hypertension       aspirin 81 MG tablet     100 tablet    Take 1 tablet (81 mg) by mouth daily    Hypertension goal BP (blood pressure) < 140/90       capsaicin 0.075 % cream    TRIXAICIN HP    180 g    Apply topically 4 times daily    Pain in joint, multiple sites       ferrous fumarate  65 mg ([place]. FE)-Vitamin C 125 mg  MG Tabs tablet    VITRON-C    60 tablet    Take 1 tablet by mouth 2 times daily    Iron deficiency anemia, unspecified iron deficiency anemia type       fluticasone 50 MCG/ACT spray    FLONASE    16 g    Spray 1-2 sprays into both nostrils daily    Seasonal allergic rhinitis, unspecified chronicity, unspecified trigger       hydrocortisone valerate 0.2 % ointment    WEST-JOBY    90 g    APPLY TOPICALLY TWICE DAILY    Seasonal allergic rhinitis       loratadine 10 MG tablet    CLARITIN    90 tablet    TAKE 1 TABLET(10 MG) BY MOUTH DAILY    Seasonal allergic rhinitis, unspecified chronicity, unspecified trigger       losartan-hydrochlorothiazide 100-25 MG per tablet    HYZAAR    90 tablet    Take 1 tablet by mouth daily    Essential hypertension

## 2017-10-16 NOTE — NURSING NOTE
"Chief Complaint   Patient presents with     Results       Initial /74 (BP Location: Left arm, Cuff Size: Adult Regular)  Pulse 84  Temp 97.7  F (36.5  C) (Oral)  Resp 20  Wt 144 lb (65.3 kg)  SpO2 98%  BMI 30.1 kg/m2 Estimated body mass index is 30.1 kg/(m^2) as calculated from the following:    Height as of 9/13/17: 4' 10\" (1.473 m).    Weight as of this encounter: 144 lb (65.3 kg).  Medication Reconciliation: complete  Trini Stoll MA        "

## 2017-10-16 NOTE — PROGRESS NOTES
SUBJECTIVE:   Margot Kenyon is a 89 year old female who presents to clinic today for the following health issues:      Discuss recent labs that show low hemoglobin and iron.        Problem list and histories reviewed & adjusted, as indicated.  Additional history: as documented    Patient does not have any subjective stool abnormalities, she has already stated that she is not interested in any endoscopic evaluation.    Reviewed and updated as needed this visit by clinical staff  Tobacco  Allergies  Meds  Problems       Reviewed and updated as needed this visit by Provider  Allergies  Meds  Problems         ROS:  Constitutional, HEENT, cardiovascular, pulmonary, gi and gu systems are negative, except as otherwise noted.      OBJECTIVE:   /74 (BP Location: Left arm, Cuff Size: Adult Regular)  Pulse 84  Temp 97.7  F (36.5  C) (Oral)  Resp 20  Wt 144 lb (65.3 kg)  SpO2 98%  BMI 30.1 kg/m2  Body mass index is 30.1 kg/(m^2).  GENERAL: healthy, alert and no distress  NECK: no adenopathy, no asymmetry, masses, or scars and thyroid normal to palpation  RESP: lungs clear to auscultation - no rales, rhonchi or wheezes  CV: regular rate and rhythm, normal S1 S2, no S3 or S4, no murmur, click or rub, no peripheral edema and peripheral pulses strong  ABDOMEN: soft, nontender, no hepatosplenomegaly, no masses and bowel sounds normal  MS: no gross musculoskeletal defects noted, no edema        ASSESSMENT/PLAN:     1. Iron deficiency anemia, unspecified iron deficiency anemia type  Conservative therapy.  Start oral iron with vitamin C, twice daily.  Recheck labs in 2 months.  - ferrous fumarate 65 mg, [place]. FE,-Vitamin C 125 mg (VITRON-C)  MG TABS tablet; Take 1 tablet by mouth 2 times daily  Dispense: 60 tablet; Refill: 3  - CBC with platelets; Future  - Iron and iron binding capacity; Future  - Ferritin; Future    2. Pain in joint, multiple sites    - capsaicin (TRIXAICIN HP) 0.075 % cream; Apply topically 4  times daily  Dispense: 180 g; Refill: prn        Mario Wilhelm MD  [facility]

## 2017-10-16 NOTE — PATIENT INSTRUCTIONS
- Start taking Vitron-C (iron supplement) twice daily.  (Prescription has been sent to your pharmacy)    - Please follow-up with me in clinic in 2 months.  - Please come in for non-fasting labs, a few days prior to the appointment with me.  You may schedule appointments at our , or by calling [phone].

## 2017-12-13 DIAGNOSIS — D50.9 IRON DEFICIENCY ANEMIA, UNSPECIFIED IRON DEFICIENCY ANEMIA TYPE: ICD-10-CM

## 2017-12-13 LAB
ERYTHROCYTE [DISTWIDTH] IN BLOOD BY AUTOMATED COUNT: 18.1 % (ref 10–15)
FERRITIN SERPL-MCNC: 52 NG/ML (ref 8–252)
HCT VFR BLD AUTO: 43.4 % (ref 35–47)
HGB BLD-MCNC: 13.6 G/DL (ref 11.7–15.7)
IRON SATN MFR SERPL: 23 % (ref 15–46)
IRON SERPL-MCNC: 72 UG/DL (ref 35–180)
MCH RBC QN AUTO: 31.1 PG (ref 26.5–33)
MCHC RBC AUTO-ENTMCNC: 31.3 G/DL (ref 31.5–36.5)
MCV RBC AUTO: 99 FL (ref 78–100)
PLATELET # BLD AUTO: 245 10E9/L (ref 150–450)
RBC # BLD AUTO: 4.37 10E12/L (ref 3.8–5.2)
TIBC SERPL-MCNC: 317 UG/DL (ref 240–430)
WBC # BLD AUTO: 5.7 10E9/L (ref 4–11)

## 2017-12-13 PROCEDURE — 82728 ASSAY OF FERRITIN: CPT | Performed by: INTERNAL MEDICINE

## 2017-12-13 PROCEDURE — 83540 ASSAY OF IRON: CPT | Performed by: INTERNAL MEDICINE

## 2017-12-13 PROCEDURE — 36415 COLL VENOUS BLD VENIPUNCTURE: CPT | Performed by: INTERNAL MEDICINE

## 2017-12-13 PROCEDURE — 85027 COMPLETE CBC AUTOMATED: CPT | Performed by: INTERNAL MEDICINE

## 2017-12-13 PROCEDURE — 83550 IRON BINDING TEST: CPT | Performed by: INTERNAL MEDICINE

## 2017-12-18 ENCOUNTER — OFFICE VISIT (OUTPATIENT)
Dept: INTERNAL MEDICINE | Facility: CLINIC | Age: 82
End: 2017-12-18
Payer: COMMERCIAL

## 2017-12-18 VITALS
TEMPERATURE: 97.3 F | BODY MASS INDEX: 29.34 KG/M2 | OXYGEN SATURATION: 96 % | DIASTOLIC BLOOD PRESSURE: 60 MMHG | WEIGHT: 140.4 LBS | RESPIRATION RATE: 18 BRPM | HEART RATE: 77 BPM | SYSTOLIC BLOOD PRESSURE: 120 MMHG

## 2017-12-18 DIAGNOSIS — D50.9 IRON DEFICIENCY ANEMIA, UNSPECIFIED IRON DEFICIENCY ANEMIA TYPE: Primary | ICD-10-CM

## 2017-12-18 DIAGNOSIS — I10 ESSENTIAL HYPERTENSION: ICD-10-CM

## 2017-12-18 PROCEDURE — 99213 OFFICE O/P EST LOW 20 MIN: CPT | Performed by: INTERNAL MEDICINE

## 2017-12-18 NOTE — MR AVS SNAPSHOT
"              After Visit Summary   12/18/2017    Margot Kenyon    MRN: [id number]           Patient Information     Date Of Birth          8/20/1920        Visit Information        Provider Department      12/18/2017 8:00 AM Mario Wilhelm MD; [employer] [facility]        Today's Diagnoses     Iron deficiency anemia, unspecified iron deficiency anemia type    -  1    Essential hypertension          Care Instructions    - Decrease the iron supplement to once daily, and continue this indefinitely.           Follow-ups after your visit        Who to contact     If you have questions or need follow up information about today's clinic visit or your schedule please contact [facility] directly at [phone].  Normal or non-critical lab and imaging results will be communicated to you by MyChart, letter or phone within 4 business days after the clinic has received the results. If you do not hear from us within 7 days, please contact the clinic through "[employer]hart or phone. If you have a critical or abnormal lab result, we will notify you by phone as soon as possible.  Submit refill requests through [employer] or call your pharmacy and they will forward the refill request to us. Please allow 3 business days for your refill to be completed.          Additional Information About Your Visit        MyChart Information     [employer] lets you send messages to your doctor, view your test results, renew your prescriptions, schedule appointments and more. To sign up, go to [email or web address] . Click on \"Log in\" on the left side of the screen, which will take you to the Welcome page. Then click on \"Sign up Now\" on the right side of the page.     You will be asked to enter the access code listed below, as well as some personal information. Please follow the directions to create your username and password.     Your access code is: [email or web address]  Expires: 1/14/2018  " 9:13 AM     Your access code will  in 90 days. If you need help or a new code, please call your [place] clinic or [phone].        Care EveryWhere ID     This is your Care EveryWhere ID. This could be used by other organizations to access your [place] medical records  [id number]        Your Vitals Were     Pulse Temperature Respirations Pulse Oximetry BMI (Body Mass Index)       77 97.3  F (36.3  C) (Oral) 18 96% 29.34 kg/m2        Blood Pressure from Last 3 Encounters:   17 120/60   10/16/17 152/74   17 148/66    Weight from Last 3 Encounters:   17 140 lb 6.4 oz (63.7 kg)   10/16/17 144 lb (65.3 kg)   17 140 lb 1.6 oz (63.5 kg)              Today, you had the following     No orders found for display         Today's Medication Changes          These changes are accurate as of: 17  9:19 AM.  If you have any questions, ask your nurse or doctor.               These medicines have changed or have updated prescriptions.        Dose/Directions    ferrous fumarate 65 mg ([place]. FE)-Vitamin C 125 mg  MG Tabs tablet   Commonly known as:  VITRON-C   This may have changed:  when to take this   Used for:  Iron deficiency anemia, unspecified iron deficiency anemia type   Changed by:  Mario Wilhelm MD        Dose:  1 tablet   Take 1 tablet by mouth daily   Quantity:  90 tablet   Refills:  3            Where to get your medicines      These medications were sent to [facility] [address] [place] [address] [address] AT [facility] [place] [address] [address], [facility] [address]    Hours:  24-hours Phone:  [phone]     ferrous fumarate 65 mg ([place]. FE)-Vitamin C 125 mg  MG Tabs tablet                Primary Care Provider Office Phone # Fax #    Mario Wilhelm [phone]       [address] [place] [address]        Equal Access to Services     ART SHARP AH: Jennifer Stephenson, juanita casiano, qaybta  madelyn danielbecca hernadez ah. Margot [facility] [phone].    ATENCIÓN: Si elissa thao, tiene a diaz disposición servicios gratuitos de asistencia lingüística. Lisbeth al [phone].    We comply with applicable federal civil rights laws and [place] laws. We do not discriminate on the basis of race, color, national origin, age, disability, sex, sexual orientation, or gender identity.            Thank you!     Thank you for choosing [facility]  for your care. Our goal is always to provide you with excellent care. Hearing back from our patients is one way we can continue to improve our services. Please take a few minutes to complete the written survey that you may receive in the mail after your visit with us. Thank you!             Your Updated Medication List - Protect others around you: Learn how to safely use, store and throw away your medicines at www.disposemymeds.org.          This list is accurate as of: 12/18/17  9:19 AM.  Always use your most recent med list.                   Brand Name Dispense Instructions for use Diagnosis    amLODIPine 5 MG tablet    NORVASC    90 tablet    Take 1 tablet (5 mg) by mouth daily (with breakfast)    Essential hypertension       aspirin 81 MG tablet     100 tablet    Take 1 tablet (81 mg) by mouth daily    Hypertension goal BP (blood pressure) < 140/90       capsaicin 0.075 % cream    TRIXAICIN HP    180 g    Apply topically 4 times daily    Pain in joint, multiple sites       ferrous fumarate 65 mg ([place]. FE)-Vitamin C 125 mg  MG Tabs tablet    VITRON-C    90 tablet    Take 1 tablet by mouth daily    Iron deficiency anemia, unspecified iron deficiency anemia type       fluticasone 50 MCG/ACT spray    FLONASE    16 g    Spray 1-2 sprays into both nostrils daily    Seasonal allergic rhinitis, unspecified chronicity, unspecified trigger       hydrocortisone valerate 0.2 % ointment    WEST-JOBY    90 g    APPLY TOPICALLY  TWICE DAILY    Seasonal allergic rhinitis       loratadine 10 MG tablet    CLARITIN    90 tablet    TAKE 1 TABLET(10 MG) BY MOUTH DAILY    Seasonal allergic rhinitis, unspecified chronicity, unspecified trigger       losartan-hydrochlorothiazide 100-25 MG per tablet    HYZAAR    90 tablet    Take 1 tablet by mouth daily    Essential hypertension

## 2017-12-18 NOTE — NURSING NOTE
"Chief Complaint   Patient presents with     Anemia     Recheck Medication       Initial /60  Pulse 77  Temp 97.3  F (36.3  C) (Oral)  Resp 18  Wt 140 lb 6.4 oz (63.7 kg)  SpO2 96%  BMI 29.34 kg/m2 Estimated body mass index is 29.34 kg/(m^2) as calculated from the following:    Height as of 9/13/17: 4' 10\" (1.473 m).    Weight as of this encounter: 140 lb 6.4 oz (63.7 kg).  Blood pressure completed using cuff size: regular    "

## 2017-12-18 NOTE — PROGRESS NOTES
SUBJECTIVE:   Margot Kenyon is a 89 year old female who presents to clinic today for the following health issues:      Medication Followup of anemia    Taking Medication as prescribed: yes    Side Effects:  None    Medication Helping Symptoms:  yes             Problem list and histories reviewed & adjusted, as indicated.  Additional history: as documented    Patient here for follow-up iron deficiency anemia.  At last visit her hemoglobin was under 8, with iron levels and ferritin both low.  She has been on twice daily Vitron C since then which she has tolerated well.  Follow-up lab testing has shown a normal hemoglobin (13.6), and normal ferritin levels.    Reviewed and updated as needed this visit by clinical staffTobacco  Allergies  Meds  Problems  Med Hx  Surg Hx  Fam Hx  Soc Hx        Reviewed and updated as needed this visit by Provider  Allergies  Meds  Problems         ROS:  Constitutional, HEENT, cardiovascular, pulmonary, gi and gu systems are negative, except as otherwise noted.      OBJECTIVE:   /60  Pulse 77  Temp 97.3  F (36.3  C) (Oral)  Resp 18  Wt 140 lb 6.4 oz (63.7 kg)  SpO2 96%  BMI 29.34 kg/m2  Body mass index is 29.34 kg/(m^2).  GENERAL: healthy, alert and no distress  NECK: no adenopathy, no asymmetry, masses, or scars and thyroid normal to palpation  RESP: lungs clear to auscultation - no rales, rhonchi or wheezes  CV: regular rate and rhythm, normal S1 S2, no S3 or S4, no murmur, click or rub, no peripheral edema and peripheral pulses strong  ABDOMEN: soft, nontender, no hepatosplenomegaly, no masses and bowel sounds normal  MS: no gross musculoskeletal defects noted, no edema        ASSESSMENT/PLAN:     1. Iron deficiency anemia, unspecified iron deficiency anemia type  Resolved, would continue iron supplementation on a maintenance basis, reduce dose to only once daily.  - ferrous fumarate 65 mg, [place]. FE,-Vitamin C 125 mg (VITRON-C)  MG TABS tablet; Take 1 tablet  by mouth daily  Dispense: 90 tablet; Refill: 3    2. Essential hypertension  Stable, reasonably well-controlled.          Mario Wilhelm MD  [facility]

## 2018-06-27 ENCOUNTER — TRANSFERRED RECORDS (OUTPATIENT)
Dept: HEALTH INFORMATION MANAGEMENT | Facility: CLINIC | Age: 83
End: 2018-06-27

## 2018-06-27 ENCOUNTER — OFFICE VISIT (OUTPATIENT)
Dept: INTERNAL MEDICINE | Facility: CLINIC | Age: 83
End: 2018-06-27
Payer: COMMERCIAL

## 2018-06-27 VITALS
BODY MASS INDEX: 27.94 KG/M2 | SYSTOLIC BLOOD PRESSURE: 135 MMHG | TEMPERATURE: 98.1 F | OXYGEN SATURATION: 98 % | WEIGHT: 133.7 LBS | RESPIRATION RATE: 16 BRPM | HEART RATE: 70 BPM | DIASTOLIC BLOOD PRESSURE: 75 MMHG

## 2018-06-27 DIAGNOSIS — D50.9 IRON DEFICIENCY ANEMIA, UNSPECIFIED IRON DEFICIENCY ANEMIA TYPE: ICD-10-CM

## 2018-06-27 LAB
ANION GAP SERPL CALCULATED.3IONS-SCNC: 7 MMOL/L (ref 3–14)
BUN SERPL-MCNC: 15 MG/DL (ref 7–30)
CALCIUM SERPL-MCNC: 10.6 MG/DL (ref 8.5–10.1)
CHLORIDE SERPL-SCNC: 106 MMOL/L (ref 94–109)
CO2 SERPL-SCNC: 29 MMOL/L (ref 20–32)
CREAT SERPL-MCNC: 0.84 MG/DL (ref 0.52–1.04)
ERYTHROCYTE [DISTWIDTH] IN BLOOD BY AUTOMATED COUNT: 12.6 % (ref 10–15)
GFR SERPL CREATININE-BSD FRML MDRD: 63 ML/MIN/1.7M2
GLUCOSE SERPL-MCNC: 109 MG/DL (ref 70–99)
HCT VFR BLD AUTO: 39.8 % (ref 35–47)
HGB BLD-MCNC: 12.7 G/DL (ref 11.7–15.7)
IRON SATN MFR SERPL: 39 % (ref 15–46)
IRON SERPL-MCNC: 103 UG/DL (ref 35–180)
MCH RBC QN AUTO: 33.5 PG (ref 26.5–33)
MCHC RBC AUTO-ENTMCNC: 31.9 G/DL (ref 31.5–36.5)
MCV RBC AUTO: 105 FL (ref 78–100)
PLATELET # BLD AUTO: 227 10E9/L (ref 150–450)
POTASSIUM SERPL-SCNC: 3.6 MMOL/L (ref 3.4–5.3)
RBC # BLD AUTO: 3.79 10E12/L (ref 3.8–5.2)
SODIUM SERPL-SCNC: 142 MMOL/L (ref 133–144)
TIBC SERPL-MCNC: 267 UG/DL (ref 240–430)
WBC # BLD AUTO: 5.9 10E9/L (ref 4–11)

## 2018-06-27 PROCEDURE — 80048 BASIC METABOLIC PNL TOTAL CA: CPT | Performed by: INTERNAL MEDICINE

## 2018-06-27 PROCEDURE — T1013 SIGN LANG/ORAL INTERPRETER: HCPCS | Mod: U3 | Performed by: INTERNAL MEDICINE

## 2018-06-27 PROCEDURE — 83550 IRON BINDING TEST: CPT | Performed by: INTERNAL MEDICINE

## 2018-06-27 PROCEDURE — 85027 COMPLETE CBC AUTOMATED: CPT | Performed by: INTERNAL MEDICINE

## 2018-06-27 PROCEDURE — 36415 COLL VENOUS BLD VENIPUNCTURE: CPT | Performed by: INTERNAL MEDICINE

## 2018-06-27 PROCEDURE — 99207 ZZC RSCC CODE FOR CODING REVIEW: CPT | Performed by: INTERNAL MEDICINE

## 2018-06-27 PROCEDURE — 83540 ASSAY OF IRON: CPT | Performed by: INTERNAL MEDICINE

## 2018-06-27 PROCEDURE — 99214 OFFICE O/P EST MOD 30 MIN: CPT | Performed by: INTERNAL MEDICINE

## 2018-06-27 RX ORDER — CELECOXIB 100 MG/1
CAPSULE ORAL
Qty: 90 CAPSULE | Refills: 3 | Status: SHIPPED | OUTPATIENT
Start: 2018-06-27 | End: 2019-04-01

## 2018-06-27 RX ORDER — TRAMADOL HYDROCHLORIDE 50 MG/1
50 TABLET ORAL EVERY 8 HOURS PRN
Qty: 45 TABLET | Refills: 1 | Status: SHIPPED | OUTPATIENT
Start: 2018-06-27 | End: 2018-10-15

## 2018-06-27 RX ORDER — CELECOXIB 100 MG/1
100 CAPSULE ORAL DAILY
Qty: 30 CAPSULE | Refills: 11 | Status: SHIPPED | OUTPATIENT
Start: 2018-06-27 | End: 2018-06-27

## 2018-06-27 NOTE — LETTER
6/27/2018         Margot Kenyon  [address]  [facility] [address]            Dear Ms. Kenyon,    I am writing to inform you of the lab tests you had performed recently.      Your lab results are as follows:    Kidney function: NORMAL  Hemoglobin and iron levels: NORMAL, AND STABLE FROM PREVIOUSLY  Electrolytes: NORMAL  Glucose: NORMAL    Your hemoglobin is still within normal limits - please continue the iron supplementation once daily.    Thank you for allowing me to participate in your care.  If you have further questions, please contact us at [phone].      Sincerely,        HIGINIO Wilhelm MD  Dept. of Internal Medicine  [facility]

## 2018-06-27 NOTE — PROGRESS NOTES
SUBJECTIVE:   Margot Kenyon is a 89 year old female who presents to clinic today for the following health issues:      Musculoskeletal problem/pain      Duration: for a long time    Description  Location: both knees    Intensity:  9/10    Accompanying signs and symptoms: entire leg both legs    History  Previous similar problem: YES  Previous evaluation:  x-ray, ultrasound, MRI and CT    Precipitating or alleviating factors:  Trauma or overuse: YES  Aggravating factors include: sitting, standing, walking and climbing stairs    Therapies tried and outcome: Injections every 3 months with no relief, requesting pain medication for relief.          Problem list and histories reviewed & adjusted, as indicated.  Additional history: as documented    Previous x-rays have shown osteoarthritic changes.    Reviewed and updated as needed this visit by clinical staff       Reviewed and updated as needed this visit by Provider         ROS:  Constitutional, HEENT, cardiovascular, pulmonary, GI, , musculoskeletal, neuro, skin, endocrine and psych systems are negative, except as otherwise noted.    OBJECTIVE:     /75  Pulse 70  Temp 98.1  F (36.7  C) (Oral)  Resp 16  Wt 133 lb 11.2 oz (60.6 kg)  SpO2 98%  BMI 27.94 kg/m2  Body mass index is 27.94 kg/(m^2).  GENERAL: healthy, alert and no distress  NECK: no adenopathy, no asymmetry, masses, or scars and thyroid normal to palpation  RESP: lungs clear to auscultation - no rales, rhonchi or wheezes  CV: regular rate and rhythm, normal S1 S2, no S3 or S4, no murmur, click or rub, no peripheral edema and peripheral pulses strong  ABDOMEN: soft, nontender, no hepatosplenomegaly, no masses and bowel sounds normal  MS: no gross musculoskeletal defects noted, no edema        ASSESSMENT/PLAN:     1. Osteoarthrosis involving lower leg  Discussed options, will try low potency pain medication as written.  - traMADol (ULTRAM) 50 MG tablet; Take 1 tablet (50 mg) by mouth every 8 hours  as needed for severe pain  Dispense: 45 tablet; Refill: 1    2. Iron deficiency anemia, unspecified iron deficiency anemia type  Recheck hemoglobin and iron levels  - Basic metabolic panel  (Ca, Cl, CO2, Creat, Gluc, K, Na, BUN)  - CBC with platelets  - Iron and iron binding capacity                Mario Wilhelm MD  [facility]

## 2018-06-27 NOTE — MR AVS SNAPSHOT
"              After Visit Summary   2018    Margot Kenyon    MRN: [id number]           Patient Information     Date Of Birth          1920        Visit Information        Provider Department      2018 9:15 AM Janay Paige Kevin Tyler, MD [facility]        Today's Diagnoses     Osteoarthrosis involving lower leg    -  1    Iron deficiency anemia, unspecified iron deficiency anemia type           Follow-ups after your visit        Who to contact     If you have questions or need follow up information about today's clinic visit or your schedule please contact [facility] directly at [phone].  Normal or non-critical lab and imaging results will be communicated to you by MyChart, letter or phone within 4 business days after the clinic has received the results. If you do not hear from us within 7 days, please contact the clinic through MyChart or phone. If you have a critical or abnormal lab result, we will notify you by phone as soon as possible.  Submit refill requests through [employer] or call your pharmacy and they will forward the refill request to us. Please allow 3 business days for your refill to be completed.          Additional Information About Your Visit        MyChart Information     [employer] lets you send messages to your doctor, view your test results, renew your prescriptions, schedule appointments and more. To sign up, go to [email or web address] . Click on \"Log in\" on the left side of the screen, which will take you to the Welcome page. Then click on \"Sign up Now\" on the right side of the page.     You will be asked to enter the access code listed below, as well as some personal information. Please follow the directions to create your username and password.     Your access code is: [email or web address]  Expires: 10/9/2018  8:16 PM     Your access code will  in 90 days. If you need help or a new code, please call your [place] clinic or " [phone].        Care EveryWhere ID     This is your Care EveryWhere ID. This could be used by other organizations to access your [place] medical records  [id number]        Your Vitals Were     Pulse Temperature Respirations Pulse Oximetry BMI (Body Mass Index)       70 98.1  F (36.7  C) (Oral) 16 98% 27.94 kg/m2        Blood Pressure from Last 3 Encounters:   06/27/18 135/75   12/18/17 120/60   10/16/17 152/74    Weight from Last 3 Encounters:   06/27/18 133 lb 11.2 oz (60.6 kg)   12/18/17 140 lb 6.4 oz (63.7 kg)   10/16/17 144 lb (65.3 kg)              We Performed the Following     Basic metabolic panel  (Ca, Cl, CO2, Creat, Gluc, K, Na, BUN)     CBC with platelets     Iron and iron binding capacity          Today's Medication Changes          These changes are accurate as of 6/27/18 11:59 PM.  If you have any questions, ask your nurse or doctor.               Start taking these medicines.        Dose/Directions    celecoxib 100 MG capsule   Commonly known as:  celeBREX   Used for:  Osteoarthrosis involving lower leg   Started by:  Mario Wilhelm MD        TAKE 1 CAPSULE(100 MG) BY MOUTH DAILY   Quantity:  90 capsule   Refills:  3       traMADol 50 MG tablet   Commonly known as:  ULTRAM   Used for:  Osteoarthrosis involving lower leg   Started by:  Mario Wilhelm MD        Dose:  50 mg   Take 1 tablet (50 mg) by mouth every 8 hours as needed for severe pain   Quantity:  45 tablet   Refills:  1            Where to get your medicines      These medications were sent to [facility] [address] [facility] [address] AT [facility] [address], [facility] [address]     Phone:  [phone]     celecoxib 100 MG capsule         Some of these will need a paper prescription and others can be bought over the counter.  Ask your nurse if you have questions.     Bring a paper prescription for each of these medications     traMADol 50 MG tablet                Information about OPIOIDS     PRESCRIPTION OPIOIDS: WHAT YOU NEED TO KNOW   We gave you an opioid (narcotic) pain medicine. It is important to manage your pain, but opioids are not always the best choice. You should first try all the other options your care team gave you. Take this medicine for as short a time (and as few doses) as possible.     These medicines have risks:    DO NOT drive when on new or higher doses of pain medicine. These medicines can affect your alertness and reaction times, and you could be arrested for driving under the influence (DUI). If you need to use opioids long-term, talk to your care team about driving.    DO NOT operate heave machinery    DO NOT do any other dangerous activities while taking these medicines.     DO NOT drink any alcohol while taking these medicines.      If the opioid prescribed includes acetaminophen, DO NOT take with any other medicines that contain acetaminophen. Read all labels carefully. Look for the word  acetaminophen  or  Tylenol.  Ask your pharmacist if you have questions or are unsure.    You can get addicted to pain medicines, especially if you have a history of addiction (chemical, alcohol or substance dependence). Talk to your care team about ways to reduce this risk.    Store your pills in a secure place, locked if possible. We will not replace any lost or stolen medicine. If you don t finish your medicine, please throw away (dispose) as directed by your pharmacist. The [place] Pollution Control Agency has more information about safe disposal: https://www.pca.[facility].mn.us/living-green/managing-unwanted-medications.     All opioids tend to cause constipation. Drink plenty of water and eat foods that have a lot of fiber, such as fruits, vegetables, prune juice, apple juice and high-fiber cereal. Take a laxative (Miralax, milk of magnesia, Colace, Senna) if you don t move your bowels at least every other day.          Primary Care Provider Office Phone #  Fax #    Mario Wilhelm [phone] [address] [address]        Equal Access to Services     ART SHARP : Jennifer jolly kerry Stephenson, wavincentda luqadaha, qaybta kaalmada joao, madelyn linder bridgetbecca diaz lajeremynigel saavedra. So [facility] [phone].    ATENCIÓN: Si habla español, tiene a diaz disposición servicios gratuitos de asistencia lingüística. Llame al [phone].    We comply with applicable federal civil rights laws and [place] laws. We do not discriminate on the basis of race, color, national origin, age, disability, sex, sexual orientation, or gender identity.            Thank you!     Thank you for choosing [facility]  for your care. Our goal is always to provide you with excellent care. Hearing back from our patients is one way we can continue to improve our services. Please take a few minutes to complete the written survey that you may receive in the mail after your visit with us. Thank you!             Your Updated Medication List - Protect others around you: Learn how to safely use, store and throw away your medicines at www.disposemymeds.org.          This list is accurate as of 6/27/18 11:59 PM.  Always use your most recent med list.                   Brand Name Dispense Instructions for use Diagnosis    amLODIPine 5 MG tablet    NORVASC    90 tablet    Take 1 tablet (5 mg) by mouth daily (with breakfast)    Essential hypertension       aspirin 81 MG tablet     100 tablet    Take 1 tablet (81 mg) by mouth daily    Hypertension goal BP (blood pressure) < 140/90       capsaicin 0.075 % cream    TRIXAICIN HP    180 g    Apply topically 4 times daily    Pain in joint, multiple sites       celecoxib 100 MG capsule    celeBREX    90 capsule    TAKE 1 CAPSULE(100 MG) BY MOUTH DAILY    Osteoarthrosis involving lower leg       ferrous fumarate 65 mg ([place]. FE)-Vitamin C 125 mg  MG Tabs tablet    VITRON-C    90 tablet    Take 1  tablet by mouth daily    Iron deficiency anemia, unspecified iron deficiency anemia type       fluticasone 50 MCG/ACT spray    FLONASE    16 g    Spray 1-2 sprays into both nostrils daily    Seasonal allergic rhinitis, unspecified chronicity, unspecified trigger       hydrocortisone valerate 0.2 % ointment    WEST-JOBY    90 g    APPLY TOPICALLY TWICE DAILY    Seasonal allergic rhinitis       loratadine 10 MG tablet    CLARITIN    90 tablet    TAKE 1 TABLET(10 MG) BY MOUTH DAILY    Seasonal allergic rhinitis, unspecified chronicity, unspecified trigger       losartan-hydrochlorothiazide 100-25 MG per tablet    HYZAAR    90 tablet    Take 1 tablet by mouth daily    Essential hypertension       traMADol 50 MG tablet    ULTRAM    45 tablet    Take 1 tablet (50 mg) by mouth every 8 hours as needed for severe pain    Osteoarthrosis involving lower leg

## 2018-07-11 ENCOUNTER — MEDICAL CORRESPONDENCE (OUTPATIENT)
Dept: HEALTH INFORMATION MANAGEMENT | Facility: CLINIC | Age: 83
End: 2018-07-11

## 2018-08-08 DIAGNOSIS — J30.2 SEASONAL ALLERGIC RHINITIS, UNSPECIFIED CHRONICITY, UNSPECIFIED TRIGGER: ICD-10-CM

## 2018-08-08 DIAGNOSIS — I10 ESSENTIAL HYPERTENSION: ICD-10-CM

## 2018-08-08 DIAGNOSIS — I10 HYPERTENSION GOAL BP (BLOOD PRESSURE) < 140/90: ICD-10-CM

## 2018-08-09 RX ORDER — AMLODIPINE BESYLATE 5 MG/1
TABLET ORAL
Qty: 90 TABLET | Refills: 2 | Status: SHIPPED | OUTPATIENT
Start: 2018-08-09 | End: 2019-04-01

## 2018-08-09 RX ORDER — ASPIRIN 81 MG
TABLET, DELAYED RELEASE (ENTERIC COATED) ORAL
Qty: 100 TABLET | Refills: 3 | Status: SHIPPED | OUTPATIENT
Start: 2018-08-09 | End: 2019-04-01

## 2018-08-09 RX ORDER — FLUTICASONE PROPIONATE 50 MCG
SPRAY, SUSPENSION (ML) NASAL
Qty: 16 ML | Refills: 9 | Status: SHIPPED | OUTPATIENT
Start: 2018-08-09

## 2018-08-09 RX ORDER — LOSARTAN POTASSIUM AND HYDROCHLOROTHIAZIDE 25; 100 MG/1; MG/1
TABLET ORAL
Qty: 90 TABLET | Refills: 2 | Status: SHIPPED | OUTPATIENT
Start: 2018-08-09 | End: 2019-04-01

## 2018-08-09 RX ORDER — LORATADINE 10 MG/1
TABLET ORAL
Qty: 90 TABLET | Refills: 2 | Status: SHIPPED | OUTPATIENT
Start: 2018-08-09 | End: 2019-04-01

## 2018-08-09 NOTE — TELEPHONE ENCOUNTER
"Requested Prescriptions   Pending Prescriptions Disp Refills     loratadine (CLARITIN) 10 MG tablet [Pharmacy Med Name: LORATADINE 10MG TABLETS] 90 tablet 0    Last Written Prescription Date:  7/13/17  Last Fill Quantity: 90,  # refills: 3   Last office visit: 6/27/2018 with prescribing provider:  6/27/18   Future Office Visit:     Sig: TAKE 1 TABLET(10 MG) BY MOUTH DAILY    Antihistamines Protocol Failed    8/8/2018  4:55 PM       Failed - Patient is 3-64 years of age    Apply weight-based dosing for peds patients age 3 - 12 years of age.    Forward request to provider for patients under the age of 3 or over the age of 64.         Passed - Recent (12 mo) or future (30 days) visit within the authorizing provider's specialty    Patient had office visit in the last 12 months or has a visit in the next 30 days with authorizing provider or within the authorizing provider's specialty.  See \"Patient Info\" tab in inbasket, or \"Choose Columns\" in Meds & Orders section of the refill encounter.            amLODIPine (NORVASC) 5 MG tablet [Pharmacy Med Name: AMLODIPINE BESYLATE 5MG TABLETS] 90 tablet 0    Last Written Prescription Date:  7/13/17  Last Fill Quantity: 90,  # refills: 9   Last office visit: 6/27/2018 with prescribing provider:  6/27/18   Future Office Visit:     Sig: TAKE 1 TABLET(5 MG) BY MOUTH DAILY WITH BREAKFAST    Calcium Channel Blockers Protocol  Passed    8/8/2018  4:55 PM       Passed - Blood pressure under 140/90 in past 12 months    BP Readings from Last 3 Encounters:   06/27/18 135/75   12/18/17 120/60   10/16/17 152/74                Passed - Recent (12 mo) or future (30 days) visit within the authorizing provider's specialty    Patient had office visit in the last 12 months or has a visit in the next 30 days with authorizing provider or within the authorizing provider's specialty.  See \"Patient Info\" tab in inbasket, or \"Choose Columns\" in Meds & Orders section of the refill encounter.           " "Passed - Patient is age 18 or older       Passed - No active pregnancy on record       Passed - Normal serum creatinine on file in past 12 months    Recent Labs   Lab Test  06/27/18   1008   CR  0.84            Passed - No positive pregnancy test in past 12 months        losartan-hydrochlorothiazide (HYZAAR) 100-25 MG per tablet [Pharmacy Med Name: LOSARTAN/HCTZ 100/25MG TABLETS] 90 tablet 0    Last Written Prescription Date:  7/13/18  Last Fill Quantity: 90,  # refills: 3   Last office visit: 6/27/2018 with prescribing provider:  6/27/18   Future Office Visit:     Sig: TAKE 1 TABLET BY MOUTH DAILY    Angiotensin-II Receptors Passed    8/8/2018  4:55 PM       Passed - Blood pressure under 140/90 in past 12 months    BP Readings from Last 3 Encounters:   06/27/18 135/75   12/18/17 120/60   10/16/17 152/74                Passed - Recent (12 mo) or future (30 days) visit within the authorizing provider's specialty    Patient had office visit in the last 12 months or has a visit in the next 30 days with authorizing provider or within the authorizing provider's specialty.  See \"Patient Info\" tab in inbasket, or \"Choose Columns\" in Meds & Orders section of the refill encounter.           Passed - Patient is age 18 or older       Passed - No active pregnancy on record       Passed - Normal serum creatinine on file in past 12 months    Recent Labs   Lab Test  06/27/18   1008   CR  0.84            Passed - Normal serum potassium on file in past 12 months    Recent Labs   Lab Test  06/27/18   1008   POTASSIUM  3.6                   Passed - No positive pregnancy test in past 12 months        fluticasone (FLONASE) 50 MCG/ACT spray [Pharmacy Med Name: FLUTICASONE 50MCG NASAL SP (120) RX] 16 mL 0    Last Written Prescription Date:  7/13/17  Last Fill Quantity: 16g,  # refills: 11   Last office visit: 6/27/2018 with prescribing provider:  6/27/18   Future Office Visit:     Sig: SHAKE LIQUID AND USE 1 TO 2 SPRAYS IN EACH NOSTRIL " "DAILY    Inhaled Steroids Protocol Passed    8/8/2018  4:55 PM       Passed - Patient is age 12 or older       Passed - Recent (12 mo) or future (30 days) visit within the authorizing provider's specialty    Patient had office visit in the last 12 months or has a visit in the next 30 days with authorizing provider or within the authorizing provider's specialty.  See \"Patient Info\" tab in inbasket, or \"Choose Columns\" in Meds & Orders section of the refill encounter.            ASPIRIN LOW DOSE 81 MG EC tablet [Pharmacy Med Name: ASPIRIN 81MG EC DR LOW DOSE TABLETS] 100 tablet 0    Last Written Prescription Date:  6/20/17  Last Fill Quantity: 100,  # refills: prn   Last office visit: 6/27/2018 with prescribing provider:  6/27/18   Future Office Visit:     Sig: TAKE 1 TABLET BY MOUTH EVERY DAY    Analgesics (Non-Narcotic Tylenol and ASA Only) Passed    8/8/2018  4:55 PM       Passed - Recent (12 mo) or future (30 days) visit within the authorizing provider's specialty    Patient had office visit in the last 12 months or has a visit in the next 30 days with authorizing provider or within the authorizing provider's specialty.  See \"Patient Info\" tab in inbasket, or \"Choose Columns\" in Meds & Orders section of the refill encounter.           Passed - Patient is age 20 years or older    If ASA is flagged for ages under 20 years old. Forward to provider for confirmation Ryes Syndrome is not a concern.                "

## 2018-08-09 NOTE — TELEPHONE ENCOUNTER
"Requested Prescriptions   Pending Prescriptions Disp Refills     loratadine (CLARITIN) 10 MG tablet [Pharmacy Med Name: LORATADINE 10MG TABLETS] 90 tablet 0     Sig: TAKE 1 TABLET(10 MG) BY MOUTH DAILY    Antihistamines Protocol Failed    8/8/2018  4:55 PM       Failed - Patient is 3-64 years of age    Apply weight-based dosing for peds patients age 3 - 12 years of age.    Forward request to provider for patients under the age of 3 or over the age of 64.         Passed - Recent (12 mo) or future (30 days) visit within the authorizing provider's specialty    Patient had office visit in the last 12 months or has a visit in the next 30 days with authorizing provider or within the authorizing provider's specialty.  See \"Patient Info\" tab in inbasket, or \"Choose Columns\" in Meds & Orders section of the refill encounter.         Last Written Prescription Date:  07/13/2017  Last Fill Quantity: 90,  # refills: 3   Last office visit: 6/27/2018 with prescribing provider:  Dr Wilhelm   Future Office Visit:               amLODIPine (NORVASC) 5 MG tablet [Pharmacy Med Name: AMLODIPINE BESYLATE 5MG TABLETS] 90 tablet 0     Sig: TAKE 1 TABLET(5 MG) BY MOUTH DAILY WITH BREAKFAST    Calcium Channel Blockers Protocol  Passed    8/8/2018  4:55 PM       Passed - Blood pressure under 140/90 in past 12 months    BP Readings from Last 3 Encounters:   06/27/18 135/75   12/18/17 120/60   10/16/17 152/74                Passed - Recent (12 mo) or future (30 days) visit within the authorizing provider's specialty    Patient had office visit in the last 12 months or has a visit in the next 30 days with authorizing provider or within the authorizing provider's specialty.  See \"Patient Info\" tab in inbasket, or \"Choose Columns\" in Meds & Orders section of the refill encounter.           Passed - Patient is age 18 or older       Passed - No active pregnancy on record       Passed - Normal serum creatinine on file in past 12 months    Recent Labs " "  Lab Test  06/27/18   1008   CR  0.84            Passed - No positive pregnancy test in past 12 months     Last Written Prescription Date:  07/13/2017  Last Fill Quantity: 90,  # refills: 3   Last office visit: 6/27/2018 with prescribing provider:  Dr Wilhelm   Future Office Visit:               losartan-hydrochlorothiazide (HYZAAR) 100-25 MG per tablet [Pharmacy Med Name: LOSARTAN/HCTZ 100/25MG TABLETS] 90 tablet 0     Sig: TAKE 1 TABLET BY MOUTH DAILY    Angiotensin-II Receptors Passed    8/8/2018  4:55 PM       Passed - Blood pressure under 140/90 in past 12 months    BP Readings from Last 3 Encounters:   06/27/18 135/75   12/18/17 120/60   10/16/17 152/74                Passed - Recent (12 mo) or future (30 days) visit within the authorizing provider's specialty    Patient had office visit in the last 12 months or has a visit in the next 30 days with authorizing provider or within the authorizing provider's specialty.  See \"Patient Info\" tab in inbasket, or \"Choose Columns\" in Meds & Orders section of the refill encounter.           Passed - Patient is age 18 or older       Passed - No active pregnancy on record       Passed - Normal serum creatinine on file in past 12 months    Recent Labs   Lab Test  06/27/18   1008   CR  0.84            Passed - Normal serum potassium on file in past 12 months    Recent Labs   Lab Test  06/27/18   1008   POTASSIUM  3.6                   Passed - No positive pregnancy test in past 12 months     Last Written Prescription Date:  07/13/2017  Last Fill Quantity: 90,  # refills: 3   Last office visit: 6/27/2018 with prescribing provider:  Dr Wilhelm   Future Office Visit:           fluticasone (FLONASE) 50 MCG/ACT spray [Pharmacy Med Name: FLUTICASONE 50MCG NASAL SP (120) RX] 16 mL 0     Sig: SHAKE LIQUID AND USE 1 TO 2 SPRAYS IN EACH NOSTRIL DAILY    Inhaled Steroids Protocol Passed    8/8/2018  4:55 PM       Passed - Patient is age 12 or older       Passed - Recent (12 mo) or " "future (30 days) visit within the authorizing provider's specialty    Patient had office visit in the last 12 months or has a visit in the next 30 days with authorizing provider or within the authorizing provider's specialty.  See \"Patient Info\" tab in inbasket, or \"Choose Columns\" in Meds & Orders section of the refill encounter.         Last Written Prescription Date:  07/13/2017  Last Fill Quantity: 16 g,  # refills: 11   Last office visit: 6/27/2018 with prescribing provider:  Dr Wilhelm   Future Office Visit:             ASPIRIN LOW DOSE 81 MG EC tablet [Pharmacy Med Name: ASPIRIN 81MG EC DR LOW DOSE TABLETS] 100 tablet 0     Sig: TAKE 1 TABLET BY MOUTH EVERY DAY    Analgesics (Non-Narcotic Tylenol and ASA Only) Passed    8/8/2018  4:55 PM       Passed - Recent (12 mo) or future (30 days) visit within the authorizing provider's specialty    Patient had office visit in the last 12 months or has a visit in the next 30 days with authorizing provider or within the authorizing provider's specialty.  See \"Patient Info\" tab in inbasket, or \"Choose Columns\" in Meds & Orders section of the refill encounter.           Passed - Patient is age 20 years or older    If ASA is flagged for ages under 20 years old. Forward to provider for confirmation Ryes Syndrome is not a concern.              Last Written Prescription Date:  06/20/2017  Last Fill Quantity: 100,  # refills: PRN   Last office visit: 6/27/2018 with prescribing provider:  Dr Wilhelm   Future Office Visit:      "

## 2018-10-15 NOTE — TELEPHONE ENCOUNTER
traMADol (ULTRAM) 50 MG tablet     Last Written Prescription Date:  06/27/2018  Last Fill Quantity: 45,   # refills: 1  Last Office Visit: 06/27/2018  Future Office visit:       Routing refill request to provider for review/approval because:  Drug not on the [facility], [facility] or [facility] refill protocol or controlled substance

## 2018-10-16 RX ORDER — TRAMADOL HYDROCHLORIDE 50 MG/1
50 TABLET ORAL EVERY 8 HOURS PRN
Qty: 45 TABLET | Refills: 1 | Status: SHIPPED | OUTPATIENT
Start: 2018-10-16 | End: 2019-04-01

## 2018-11-30 ENCOUNTER — TRANSFERRED RECORDS (OUTPATIENT)
Dept: HEALTH INFORMATION MANAGEMENT | Facility: CLINIC | Age: 83
End: 2018-11-30

## 2018-11-30 DIAGNOSIS — L30.9 ECZEMA, UNSPECIFIED TYPE: Primary | ICD-10-CM

## 2018-11-30 NOTE — TELEPHONE ENCOUNTER
"Requested Prescriptions   Pending Prescriptions Disp Refills     hydrocortisone valerate (WEST-JOBY) 0.2 % external ointment [Pharmacy Med Name: HYDROCORTISONE JOLEEN 0.2% OINT 45GM] 90 g 0    Last Written Prescription Date:  10/10/2017  Last Fill Quantity: 90g,  # refills: 11   Last office visit: 6/27/2018 with prescribing provider:  6/27/2018   Future Office Visit:     Sig: APPLY TOPICALLY TWICE DAILY    Topical Steroids and Nonsteroidals Protocol Passed    11/30/2018  8:55 AM       Passed - Patient is age 6 or older       Passed - Authorizing prescriber's most recent note related to this medication read.    If refill request is for ophthalmic use, please forward request to provider for approval.         Passed - High potency steroid not ordered       Passed - Recent (12 mo) or future (30 days) visit within the authorizing provider's specialty    Patient had office visit in the last 12 months or has a visit in the next 30 days with authorizing provider or within the authorizing provider's specialty.  See \"Patient Info\" tab in inbasket, or \"Choose Columns\" in Meds & Orders section of the refill encounter.                "

## 2018-12-03 RX ORDER — HYDROCORTISONE VALERATE 2 MG/G
OINTMENT TOPICAL
Qty: 90 G | Refills: 0 | Status: SHIPPED | OUTPATIENT
Start: 2018-12-03 | End: 2019-02-12

## 2018-12-03 NOTE — TELEPHONE ENCOUNTER
Routing refill request to provider for review/approval because:  Drug not on the [facility] refill protocol for associated diagnosis  Plan and follow up for med not noted in last office visit

## 2019-02-12 DIAGNOSIS — L30.9 ECZEMA, UNSPECIFIED TYPE: ICD-10-CM

## 2019-02-12 RX ORDER — HYDROCORTISONE VALERATE 2 MG/G
OINTMENT TOPICAL
Qty: 90 G | Refills: 1 | Status: SHIPPED | OUTPATIENT
Start: 2019-02-12 | End: 2019-04-01

## 2019-02-12 NOTE — TELEPHONE ENCOUNTER
"Requested Prescriptions   Pending Prescriptions Disp Refills     hydrocortisone valerate (WEST-JOBY) 0.2 % external ointment [Pharmacy Med Name: HYDROCORTISONE JOLEEN 0.2% OINT 45GM] 90 g 0    Last Written Prescription Date:  12/3/2018  Last Fill Quantity: 90g,  # refills: 0   Last office visit: 6/27/2018 with prescribing provider:  6/27/2018   Future Office Visit:     Sig: APPLY TOPICALLY TWICE DAILY    Topical Steroids and Nonsteroidals Protocol Passed - 2/12/2019  9:19 AM       Passed - Patient is age 6 or older       Passed - Authorizing prescriber's most recent note related to this medication read.    If refill request is for ophthalmic use, please forward request to provider for approval.         Passed - High potency steroid not ordered       Passed - Recent (12 mo) or future (30 days) visit within the authorizing provider's specialty    Patient had office visit in the last 12 months or has a visit in the next 30 days with authorizing provider or within the authorizing provider's specialty.  See \"Patient Info\" tab in inbasket, or \"Choose Columns\" in Meds & Orders section of the refill encounter.             Passed - Medication is active on med list          "

## 2019-02-23 DIAGNOSIS — D50.9 IRON DEFICIENCY ANEMIA, UNSPECIFIED IRON DEFICIENCY ANEMIA TYPE: ICD-10-CM

## 2019-02-24 NOTE — TELEPHONE ENCOUNTER
Requested Prescriptions   Pending Prescriptions Disp Refills     VITRON-C  MG TABS tablet [Pharmacy Med Name: VITRON-C TABLETS] 90 tablet 0     Sig: TAKE 1 TABLET BY MOUTH DAILY    There is no refill protocol information for this order        Last Written Prescription Date:  12/18/2017  Last Fill Quantity: 90,  # refills: 3   Last office visit: 6/27/2018 with prescribing provider:  6/27/2018   Future Office Visit:

## 2019-02-25 RX ORDER — BACILLUS COAGULANS 1B CELL
CAPSULE ORAL
Qty: 90 TABLET | Refills: 0 | Status: SHIPPED | OUTPATIENT
Start: 2019-02-25 | End: 2019-04-01

## 2019-02-25 NOTE — TELEPHONE ENCOUNTER
Multi-vitamin.    Prescription approved per [facility] Refill Protocol.    Michelle TERRY RN, BSN, PHN

## 2019-03-06 ENCOUNTER — TRANSFERRED RECORDS (OUTPATIENT)
Dept: HEALTH INFORMATION MANAGEMENT | Facility: CLINIC | Age: 84
End: 2019-03-06

## 2019-04-01 ENCOUNTER — OFFICE VISIT (OUTPATIENT)
Dept: INTERNAL MEDICINE | Facility: CLINIC | Age: 84
End: 2019-04-01
Payer: COMMERCIAL

## 2019-04-01 VITALS
OXYGEN SATURATION: 98 % | WEIGHT: 123.3 LBS | DIASTOLIC BLOOD PRESSURE: 68 MMHG | SYSTOLIC BLOOD PRESSURE: 118 MMHG | HEART RATE: 63 BPM | TEMPERATURE: 98.7 F | RESPIRATION RATE: 20 BRPM | BODY MASS INDEX: 25.77 KG/M2

## 2019-04-01 DIAGNOSIS — L30.9 ECZEMA, UNSPECIFIED TYPE: ICD-10-CM

## 2019-04-01 DIAGNOSIS — J30.2 SEASONAL ALLERGIC RHINITIS, UNSPECIFIED TRIGGER: ICD-10-CM

## 2019-04-01 DIAGNOSIS — D50.9 IRON DEFICIENCY ANEMIA, UNSPECIFIED IRON DEFICIENCY ANEMIA TYPE: ICD-10-CM

## 2019-04-01 DIAGNOSIS — I10 ESSENTIAL HYPERTENSION: ICD-10-CM

## 2019-04-01 DIAGNOSIS — M25.50 PAIN IN JOINT, MULTIPLE SITES: ICD-10-CM

## 2019-04-01 DIAGNOSIS — Z00.00 MEDICARE ANNUAL WELLNESS VISIT, SUBSEQUENT: Primary | ICD-10-CM

## 2019-04-01 LAB
ANION GAP SERPL CALCULATED.3IONS-SCNC: 1 MMOL/L (ref 3–14)
BUN SERPL-MCNC: 22 MG/DL (ref 7–30)
CALCIUM SERPL-MCNC: 10.8 MG/DL (ref 8.5–10.1)
CHLORIDE SERPL-SCNC: 109 MMOL/L (ref 94–109)
CO2 SERPL-SCNC: 32 MMOL/L (ref 20–32)
CREAT SERPL-MCNC: 0.89 MG/DL (ref 0.52–1.04)
ERYTHROCYTE [DISTWIDTH] IN BLOOD BY AUTOMATED COUNT: 12.5 % (ref 10–15)
GFR SERPL CREATININE-BSD FRML MDRD: 54 ML/MIN/{1.73_M2}
GLUCOSE SERPL-MCNC: 100 MG/DL (ref 70–99)
HCT VFR BLD AUTO: 37.8 % (ref 35–47)
HGB BLD-MCNC: 12.4 G/DL (ref 11.7–15.7)
MCH RBC QN AUTO: 34.2 PG (ref 26.5–33)
MCHC RBC AUTO-ENTMCNC: 32.8 G/DL (ref 31.5–36.5)
MCV RBC AUTO: 104 FL (ref 78–100)
PLATELET # BLD AUTO: 207 10E9/L (ref 150–450)
POTASSIUM SERPL-SCNC: 3.6 MMOL/L (ref 3.4–5.3)
RBC # BLD AUTO: 3.63 10E12/L (ref 3.8–5.2)
SODIUM SERPL-SCNC: 142 MMOL/L (ref 133–144)
WBC # BLD AUTO: 5.4 10E9/L (ref 4–11)

## 2019-04-01 PROCEDURE — 36415 COLL VENOUS BLD VENIPUNCTURE: CPT | Performed by: INTERNAL MEDICINE

## 2019-04-01 PROCEDURE — 80048 BASIC METABOLIC PNL TOTAL CA: CPT | Performed by: INTERNAL MEDICINE

## 2019-04-01 PROCEDURE — 99397 PER PM REEVAL EST PAT 65+ YR: CPT | Performed by: INTERNAL MEDICINE

## 2019-04-01 PROCEDURE — 85027 COMPLETE CBC AUTOMATED: CPT | Performed by: INTERNAL MEDICINE

## 2019-04-01 RX ORDER — AMLODIPINE BESYLATE 5 MG/1
TABLET ORAL
Qty: 90 TABLET | Refills: 2 | Status: CANCELLED | OUTPATIENT
Start: 2019-04-01

## 2019-04-01 RX ORDER — LOSARTAN POTASSIUM AND HYDROCHLOROTHIAZIDE 25; 100 MG/1; MG/1
1 TABLET ORAL DAILY
Qty: 90 TABLET | Refills: 3 | Status: SHIPPED | OUTPATIENT
Start: 2019-04-01 | End: 2019-12-11

## 2019-04-01 RX ORDER — AMLODIPINE BESYLATE 5 MG/1
5 TABLET ORAL
Qty: 90 TABLET | Refills: 3 | Status: SHIPPED | OUTPATIENT
Start: 2019-04-01 | End: 2020-07-03

## 2019-04-01 RX ORDER — CELECOXIB 100 MG/1
CAPSULE ORAL
Qty: 90 CAPSULE | Refills: 3 | Status: SHIPPED | OUTPATIENT
Start: 2019-04-01

## 2019-04-01 RX ORDER — LORATADINE 10 MG/1
10 TABLET ORAL DAILY
Qty: 90 TABLET | Refills: 3 | Status: SHIPPED | OUTPATIENT
Start: 2019-04-01

## 2019-04-01 RX ORDER — TRAMADOL HYDROCHLORIDE 50 MG/1
50 TABLET ORAL EVERY 8 HOURS PRN
Qty: 45 TABLET | Refills: 1 | Status: SHIPPED | OUTPATIENT
Start: 2019-04-01 | End: 2019-09-06

## 2019-04-01 RX ORDER — HYDROCORTISONE VALERATE 2 MG/G
OINTMENT TOPICAL
Qty: 90 G | Refills: 3 | Status: SHIPPED | OUTPATIENT
Start: 2019-04-01 | End: 2019-12-11 | Stop reason: ALTCHOICE

## 2019-04-01 RX ORDER — CAPSAICIN 0.75 MG/G
CREAM TOPICAL 4 TIMES DAILY
Qty: 180 G | Refills: 3 | Status: SHIPPED | OUTPATIENT
Start: 2019-04-01

## 2019-04-01 RX ORDER — ACETAMINOPHEN 500 MG
500 TABLET ORAL EVERY 6 HOURS PRN
Qty: 100 TABLET | Refills: 3 | Status: SHIPPED | OUTPATIENT
Start: 2019-04-01 | End: 2020-07-03

## 2019-04-01 NOTE — LETTER
4/1/2019         aMrgot Kenyon  [address]  [place] [address]            Dear Ms. Kenyon,    I am writing to inform you of the lab tests you had performed recently.      Your lab results are as follows:    Kidney function: NORMAL  Hemoglobin: NORMAL  Electrolytes: NORMAL  Glucose: NORMAL    Your lab testing is stable from previously.  Please continue current medications.    Thank you for allowing me to participate in your care.  If you have further questions, please contact us at [phone].      Sincerely,        HIGINIO Wilhelm MD  Dept. of Internal Medicine  [facility]

## 2019-04-01 NOTE — PROGRESS NOTES
"  SUBJECTIVE:   Margot Kenyon is a 89 year old female who presents for Preventive Visit.    Are you in the first 12 months of your Medicare Part B coverage?  No    Physical Health:    In general, how would you rate your overall physical health? fair    Outside of work, how many days during the week do you exercise? none    Outside of work, approximately how many minutes a day do you exercise?not applicable    If you drink alcohol do you typically have >3 drinks per day or >7 drinks per week? No    Do you usually eat at least 4 servings of fruit and vegetables a day, include whole grains & fiber and avoid regularly eating high fat or \"junk\" foods? Yes    Do you have any problems taking medications regularly?  No    Do you have any side effects from medications? none    Needs assistance for the following daily activities: telephone use, transportation, shopping, preparing meals, housework, bathing, laundry, money management and taking medicine - the family helps with these    Which of the following safety concerns are present in your home?  lack of grab bars in the bathroom     Hearing impairment: Sometimes people need to speak up    In the past 6 months, have you been bothered by leaking of urine? yes    Mental Health:    In general, how would you rate your overall mental or emotional health? fair  PHQ-2 Score: 0    Do you feel safe in your environment? Yes    Do you have a Health Care Directive? No: Advance care planning reviewed with patient; information given to patient to review.    Additional concerns to address?  No    Fall risk:  Fallen 2 or more times in the past year?: No  Any fall with injury in the past year?: No    Cognitive Screening: Not appropriate due to language barrier and pt and family declines    Do you have sleep apnea, excessive snoring or daytime drowsiness?: yes            Reviewed and updated as needed this visit by clinical staff  Tobacco  Allergies  Meds  Problems  Med Hx  Surg Hx  Fam " "Hx  Soc Hx          Reviewed and updated as needed this visit by Provider  Tobacco  Allergies  Meds  Problems  Med Hx  Surg Hx  Fam Hx        Social History     Tobacco Use     Smoking status: Never Smoker     Smokeless tobacco: Never Used   Substance Use Topics     Alcohol use: No     Alcohol/week: 0.0 oz                           Current providers sharing in care for this patient include:   Patient Care Team:  Mario Wilhelm MD as PCP - General (Internal Medicine)  Mario Wilhelm MD as Assigned PCP    The following health maintenance items are reviewed in Epic and correct as of today:  Health Maintenance   Topic Date Due     ZOSTER IMMUNIZATION (1 of 2) 08/20/1970     DEXA Q3 YR  07/27/2013     MEDICARE ANNUAL WELLNESS VISIT  07/13/2018     INFLUENZA VACCINE (1) 09/01/2018     FALL RISK ASSESSMENT  06/27/2019     ADVANCE DIRECTIVE PLANNING Q5 YRS  11/13/2019     PHQ-2 Q1 YR  04/01/2020     DTAP/TDAP/TD IMMUNIZATION (2 - Td) 05/26/2021     PNEUMOCOCCAL IMMUNIZATION 65+ LOW/MEDIUM RISK  Completed     IPV IMMUNIZATION  Aged Out     MENINGITIS IMMUNIZATION  Aged Out       No acute issues to discuss today, patient continues on stable antihypertensive therapy, as well as analgesics for diffuse osteoarthritic pain.  Her health is quite good for her age, she continues to live at home under the capable care of her son, and can ambulate with a wheeled walker without much difficulty.    ROS:  Constitutional, HEENT, cardiovascular, pulmonary, GI, , musculoskeletal, neuro, skin, endocrine and psych systems are negative, except as otherwise noted.    OBJECTIVE:   /68   Pulse 63   Temp 98.7  F (37.1  C) (Oral)   Resp 20   Wt 55.9 kg (123 lb 4.8 oz)   SpO2 98%   Breastfeeding? No   BMI 25.77 kg/m   Estimated body mass index is 25.77 kg/m  as calculated from the following:    Height as of 9/13/17: 1.473 m (4' 10\").    Weight as of this encounter: 55.9 kg (123 lb 4.8 oz).  EXAM:   GENERAL: " healthy, alert and no distress  NECK: no adenopathy, no asymmetry, masses, or scars and thyroid normal to palpation  RESP: lungs clear to auscultation - no rales, rhonchi or wheezes  CV: regular rate and rhythm, normal S1 S2, no S3 or S4, no murmur, click or rub, no peripheral edema and peripheral pulses strong  ABDOMEN: soft, nontender, no hepatosplenomegaly, no masses and bowel sounds normal  MS: no gross musculoskeletal defects noted, no edema        ASSESSMENT / PLAN:   1. Medicare annual wellness visit, subsequent      2. Essential hypertension  Stable, well-controlled  - aspirin (ASPIRIN LOW DOSE) 81 MG EC tablet; Take 1 tablet (81 mg) by mouth daily  Dispense: 100 tablet; Refill: 3  - losartan-hydrochlorothiazide (HYZAAR) 100-25 MG tablet; Take 1 tablet by mouth daily  Dispense: 90 tablet; Refill: 3  - amLODIPine (NORVASC) 5 MG tablet; Take 1 tablet (5 mg) by mouth daily (with breakfast)  Dispense: 90 tablet; Refill: 3  - Basic metabolic panel  (Ca, Cl, CO2, Creat, Gluc, K, Na, BUN)    3. Pain in joint, multiple sites  Continue analgesic therapy  - capsaicin (TRIXAICIN HP) 0.075 % cream; Apply topically 4 times daily  Dispense: 180 g; Refill: 3  - acetaminophen (TYLENOL) 500 MG tablet; Take 1 tablet (500 mg) by mouth every 6 hours as needed for mild pain  Dispense: 100 tablet; Refill: 3    4. Osteoarthrosis involving lower leg    - celecoxib (CELEBREX) 100 MG capsule; TAKE 1 CAPSULE(100 MG) BY MOUTH DAILY  Dispense: 90 capsule; Refill: 3  - traMADol (ULTRAM) 50 MG tablet; Take 1 tablet (50 mg) by mouth every 8 hours as needed for severe pain  Dispense: 45 tablet; Refill: 1  - acetaminophen (TYLENOL) 500 MG tablet; Take 1 tablet (500 mg) by mouth every 6 hours as needed for mild pain  Dispense: 100 tablet; Refill: 3    5. Eczema, unspecified type    - hydrocortisone valerate (WEST-JOBY) 0.2 % external ointment; APPLY TOPICALLY TWICE DAILY  Dispense: 90 g; Refill: 3    6. Seasonal allergic rhinitis, unspecified  "trigger    - loratadine (CLARITIN) 10 MG tablet; Take 1 tablet (10 mg) by mouth daily  Dispense: 90 tablet; Refill: 3    7. Iron deficiency anemia, unspecified iron deficiency anemia type    - ferrous fumarate 65 mg, [place]. FE,-Vitamin C 125 mg (VITRON-C)  MG TABS tablet; Take 1 tablet by mouth daily  Dispense: 90 tablet; Refill: 3  - CBC with platelets    End of Life Planning:  Patient currently has an advanced directive: Yes.  Practitioner is supportive of decision.    COUNSELING:  Reviewed preventive health counseling, as reflected in patient instructions    BP Readings from Last 1 Encounters:   04/01/19 118/68     Estimated body mass index is 25.77 kg/m  as calculated from the following:    Height as of 9/13/17: 1.473 m (4' 10\").    Weight as of this encounter: 55.9 kg (123 lb 4.8 oz).           reports that  has never smoked. she has never used smokeless tobacco.      Appropriate preventive services were discussed with this patient, including applicable screening as appropriate for cardiovascular disease, diabetes, osteopenia/osteoporosis, and glaucoma.  As appropriate for age/gender, discussed screening for colorectal cancer, prostate cancer, breast cancer, and cervical cancer. Checklist reviewing preventive services available has been given to the patient.    Reviewed patients plan of care and provided an AVS. The Basic Care Plan (routine screening as documented in Health Maintenance) for So meets the Care Plan requirement. This Care Plan has been established and reviewed with the Patient.    Counseling Resources:  ATP IV Guidelines  Pooled Cohorts Equation Calculator  Breast Cancer Risk Calculator  FRAX Risk Assessment  ICSI Preventive Guidelines  Dietary Guidelines for Americans, 2010  USDA's MyPlate  ASA Prophylaxis  Lung CA Screening    Mario Wilhelm MD  [facility]  "

## 2019-04-01 NOTE — PATIENT INSTRUCTIONS

## 2019-06-10 ENCOUNTER — TELEPHONE (OUTPATIENT)
Dept: INTERNAL MEDICINE | Facility: CLINIC | Age: 84
End: 2019-06-10

## 2019-06-10 ENCOUNTER — MEDICAL CORRESPONDENCE (OUTPATIENT)
Dept: HEALTH INFORMATION MANAGEMENT | Facility: CLINIC | Age: 84
End: 2019-06-10

## 2019-06-10 DIAGNOSIS — I10 ESSENTIAL HYPERTENSION: Primary | ICD-10-CM

## 2019-06-10 RX ORDER — LOSARTAN POTASSIUM 100 MG/1
100 TABLET ORAL DAILY
Qty: 90 TABLET | Refills: 2 | Status: SHIPPED | OUTPATIENT
Start: 2019-06-10

## 2019-06-10 RX ORDER — HYDROCHLOROTHIAZIDE 25 MG/1
25 TABLET ORAL DAILY
Qty: 90 TABLET | Refills: 2 | Status: SHIPPED | OUTPATIENT
Start: 2019-06-10

## 2019-06-10 NOTE — TELEPHONE ENCOUNTER
Reason for call:  Form   Our goal is to have forms completed within 72 hours, however some forms may require a visit or additional information.     Who is the form from? [facility] (if other please explain)  Where did the form come from? form was faxed in  What clinic location was the form placed at? [facility]  Where was the form placed? Pcp's Folder  What number is listed as a contact on the form? [phone]    Phone call message - patient request for a letter, form or note:     Date needed: as soon as possible  Please fax to [phone]  Has the patient signed a consent form for release of information? NO    Additional comments:   Type of letter, form or note:     Phone number to reach patient:  Cell number on file:    Telephone Information:   Mobile [phone]       Best Time:      Can we leave a detailed message on this number?  YES

## 2019-06-10 NOTE — TELEPHONE ENCOUNTER
losartan-hydrochlorothiazide (HYZAAR) 100-25 MG tablet is on backorder, please fax back alternative drug with strength,directions,quantity and refills.

## 2019-06-13 ENCOUNTER — HOSPITAL ENCOUNTER (EMERGENCY)
Facility: CLINIC | Age: 84
Discharge: HOME | End: 2019-06-13
Attending: EMERGENCY MEDICINE | Admitting: EMERGENCY MEDICINE
Payer: COMMERCIAL

## 2019-06-13 VITALS
OXYGEN SATURATION: 98 % | TEMPERATURE: 97.9 F | RESPIRATION RATE: 18 BRPM | SYSTOLIC BLOOD PRESSURE: 134 MMHG | DIASTOLIC BLOOD PRESSURE: 60 MMHG

## 2019-06-13 DIAGNOSIS — L12.0 BULLOUS PEMPHIGOID (H): ICD-10-CM

## 2019-06-13 LAB
ANION GAP SERPL CALCULATED.3IONS-SCNC: 4 MMOL/L (ref 3–14)
BASOPHILS # BLD AUTO: 0 10E9/L (ref 0–0.2)
BASOPHILS NFR BLD AUTO: 0.2 %
BUN SERPL-MCNC: 15 MG/DL (ref 7–30)
CALCIUM SERPL-MCNC: 10.7 MG/DL (ref 8.5–10.1)
CHLORIDE SERPL-SCNC: 105 MMOL/L (ref 94–109)
CO2 SERPL-SCNC: 31 MMOL/L (ref 20–32)
CREAT SERPL-MCNC: 0.89 MG/DL (ref 0.52–1.04)
CRP SERPL-MCNC: 11.8 MG/L (ref 0–8)
DIFFERENTIAL METHOD BLD: ABNORMAL
EOSINOPHIL # BLD AUTO: 0 10E9/L (ref 0–0.7)
EOSINOPHIL NFR BLD AUTO: 0.6 %
ERYTHROCYTE [DISTWIDTH] IN BLOOD BY AUTOMATED COUNT: 12.7 % (ref 10–15)
ERYTHROCYTE [SEDIMENTATION RATE] IN BLOOD BY WESTERGREN METHOD: 17 MM/H (ref 0–30)
GFR SERPL CREATININE-BSD FRML MDRD: 54 ML/MIN/{1.73_M2}
GLUCOSE SERPL-MCNC: 128 MG/DL (ref 70–99)
HCT VFR BLD AUTO: 39.1 % (ref 35–47)
HGB BLD-MCNC: 13.5 G/DL (ref 11.7–15.7)
IMM GRANULOCYTES # BLD: 0 10E9/L (ref 0–0.4)
IMM GRANULOCYTES NFR BLD: 0.4 %
LYMPHOCYTES # BLD AUTO: 0.7 10E9/L (ref 0.8–5.3)
LYMPHOCYTES NFR BLD AUTO: 13.5 %
MCH RBC QN AUTO: 34.2 PG (ref 26.5–33)
MCHC RBC AUTO-ENTMCNC: 34.5 G/DL (ref 31.5–36.5)
MCV RBC AUTO: 99 FL (ref 78–100)
MONOCYTES # BLD AUTO: 0.4 10E9/L (ref 0–1.3)
MONOCYTES NFR BLD AUTO: 7.1 %
NEUTROPHILS # BLD AUTO: 4.1 10E9/L (ref 1.6–8.3)
NEUTROPHILS NFR BLD AUTO: 78.2 %
NRBC # BLD AUTO: 0 10*3/UL
NRBC BLD AUTO-RTO: 0 /100
PLATELET # BLD AUTO: 168 10E9/L (ref 150–450)
POTASSIUM SERPL-SCNC: 3.4 MMOL/L (ref 3.4–5.3)
RBC # BLD AUTO: 3.95 10E12/L (ref 3.8–5.2)
SODIUM SERPL-SCNC: 140 MMOL/L (ref 133–144)
WBC # BLD AUTO: 5.2 10E9/L (ref 4–11)

## 2019-06-13 PROCEDURE — 86140 C-REACTIVE PROTEIN: CPT | Performed by: EMERGENCY MEDICINE

## 2019-06-13 PROCEDURE — 85025 COMPLETE CBC W/AUTO DIFF WBC: CPT | Performed by: EMERGENCY MEDICINE

## 2019-06-13 PROCEDURE — 85652 RBC SED RATE AUTOMATED: CPT | Performed by: EMERGENCY MEDICINE

## 2019-06-13 PROCEDURE — 80048 BASIC METABOLIC PNL TOTAL CA: CPT | Performed by: EMERGENCY MEDICINE

## 2019-06-13 PROCEDURE — 99283 EMERGENCY DEPT VISIT LOW MDM: CPT

## 2019-06-13 RX ORDER — PREDNISONE 20 MG/1
TABLET ORAL
Qty: 28 TABLET | Refills: 0 | Status: SHIPPED | OUTPATIENT
Start: 2019-06-13

## 2019-06-13 RX ORDER — DOXYCYCLINE 100 MG/1
100 CAPSULE ORAL ONCE
Status: DISCONTINUED | OUTPATIENT
Start: 2019-06-13 | End: 2019-06-13 | Stop reason: HOSPADM

## 2019-06-13 RX ORDER — DOXYCYCLINE 100 MG/1
100 CAPSULE ORAL 2 TIMES DAILY
Qty: 28 CAPSULE | Refills: 0 | Status: SHIPPED | OUTPATIENT
Start: 2019-06-13 | End: 2019-06-27

## 2019-06-13 RX ORDER — PREDNISONE 20 MG/1
40 TABLET ORAL ONCE
Status: DISCONTINUED | OUTPATIENT
Start: 2019-06-13 | End: 2019-06-13 | Stop reason: HOSPADM

## 2019-06-13 ASSESSMENT — ENCOUNTER SYMPTOMS
COLOR CHANGE: 1
COUGH: 0
SHORTNESS OF BREATH: 0
FEVER: 0

## 2019-06-13 NOTE — DISCHARGE INSTRUCTIONS
Please follow-up with your primary care doctor tomorrow or Monday.    Please follow-up in dermatology clinic for reevaluation, please call to make an appointment for as soon as possible.    Please wash area gently with warm soapy water daily.  Please keep a close attention to ruptured blisters and keep them clean.  Please apply Neosporin or other antibiotic ointment on top of the blisters after washing if they have ruptured.    Please return to the emergency department as needed for new or worsening symptoms including fever greater than 100.4  F, vomiting unable to keep anything down, swelling to mouth, black or bloody bowel movements, any other concerning symptoms.

## 2019-06-13 NOTE — ED AVS SNAPSHOT
Emergency Department  [address] [place] [id number]  Phone:  [phone]  Fax:  [phone]                                    Margot Kenyon   MRN: [id number]    Department:   Emergency Department   Date of Visit:  6/13/2019           After Visit Summary Signature Page    I have received my discharge instructions, and my questions have been answered. I have discussed any challenges I see with this plan with the nurse or doctor.    ..........................................................................................................................................  Patient/Patient Representative Signature      ..........................................................................................................................................  Patient Representative Print Name and Relationship to Patient    ..................................................               ................................................  Date                                   Time    ..........................................................................................................................................  Reviewed by Signature/Title    ...................................................              ..............................................  Date                                               Time          22EPIC Rev 08/18

## 2019-06-13 NOTE — ED TRIAGE NOTES
Pt to ED for anterior base of neck swelling and a rash (darkened raised hive like areas) on torso and neck. Sx started 2-3 days ago. +itching, no pain

## 2019-06-13 NOTE — ED PROVIDER NOTES
History     Chief Complaint:  Rash    The history is limited by a language barrier. A  was used (son and grandson at bedside).      Marogt Kenyon is a 89 year old female with a history of HTN, DM who presents to the emergency department for evaluation of a rash. The patient reports she has experienced 2-3 days of right anterior shoulder rash and redness, without any burns, exposure to sun, new medications, or new products. She notes new onset blisters to the area as well, and none of these have burst. She states the she has not had any pain or itching to the skin area. The patient also denies any fever, cough, or shortness of breath, as well as any history of same or similar. She indicates she feels baseline at this time. The patient last showered 2 days ago.    Allergies:  NKDA     Medications:    Norvasc  Aspirin  Celebrex  Flonase  Hydrodiuril  Claritin  Cozaar  Hyzaar  Ultram    Past Medical History:    Cataract  HTN  Osteoporosis  Cervical strain  DM    Past Surgical History:    Cataract removal    Family History:    No past pertinent family history.    Social History:  Presents with son and grandson.  Never smoker.  Positive for alcohol use, infrequent.  Marital Status:  Legally  [3]     Review of Systems   Constitutional: Negative for fever.   Respiratory: Negative for cough and shortness of breath.    Skin: Positive for color change and rash.   All other systems reviewed and are negative.    Physical Exam     Patient Vitals for the past 24 hrs:   BP Temp Temp src Heart Rate Resp SpO2   06/13/19 0952 134/60 97.9  F (36.6  C) Oral 90 18 98 %     Physical Exam  Constitutional: Well developed, nontox appearance  Head: Atraumatic.   Mouth/Throat: Oropharynx is clear and moist, no lesion or sloughing noted  Neck:  no stridor  Eyes: no scleral icterus  Cardiovascular: RRR, 2+ bilat radial pulses  Pulmonary/Chest: nml resp effort, Clear BS bilat  Abdominal: ND, soft, NT, no rebound or  guarding   Ext: Warm, well perfused, no edema  Neurological: A&O, symmetric facies, moves ext x4, steady gait  Skin: Skin is warm and dry. Erythema, vesicles and bullae extending from right ear to right lateral neck to anterior right shoulder and the chest anterior right upper arm with sparing of medial right upper arm and axilla, nontender, no drainage  Psychiatric: Behavior is normal. Thought content normal.   Nursing note and vitals reviewed.    Emergency Department Course     Laboratory:  CBC: WBC: 5.2, HGB: 13.5, PLT: 168  BMP: Glucose 128 (H), GFR Estimate 54 (L), Calcium 10.7 (H), o/w WNL (Creatinine: 0.89)    CRP inflammation: 11.8 (H)    ESR: 17    Interventions:  Vibramycin 100 mg PO ordered, not given  Deltasone 40 mg PO ordered, not given    Emergency Department Course:  Nursing notes and vitals reviewed. 1004 I performed an exam of the patient as documented above.     Medicine administered as documented above. Blood drawn. This was sent to the lab for further testing, results above.    1210 I rechecked the patient and discussed the results of her workup thus far.     Findings and plan explained to the Patient and grandson. Patient discharged home with instructions regarding supportive care, medications, and reasons to return. The importance of close follow-up was reviewed. The patient was prescribed Vibramycin, Deltasone.    I personally reviewed the laboratory results with the Patient and grandson and answered all related questions prior to discharge.    Impression & Plan      Medical Decision Making:  Margot Kenyon is a 89 year old female presenting w/ erythema and blistering of right upper chest     DDx includes bullous pemphigoid, partial-thickness burn, contact dermatitis, cellulitis, skin changes NOS.  Labs significant for elevated CRP otherwise WNL.  Interventions as noted above.  Patient's exam concerning for partial thickness burn given distribution and areas of sparing (question if the pt burned  her skin in the shower 2 days ago) although no associated pain or tenderness making burn less likely.  Possible bullous pemphigoid as well although no pruritus noted.  I think be reasonable to treat the patient for bullous pemphigoid with steroids and antibiotics as prescribed below short-term follow-up with primary care doctor and eventually dermatology for further evaluation.  Exam and consistent with shingles.  No evidence of SJS, TEN.  At this time I feel the pt is safe for discharge.  Recommendations given regarding follow up with PCP and dermatology and return to the emergency department as needed for new or worsening symptoms.  Counseled on all results, disposition and diagnosis.  Son and grandson understanding and agreeable to plan. Patient discharged in stable condition.        Diagnosis:    ICD-10-CM   1. Bullous pemphigoid L12.0       Disposition:  discharged to home    Discharge Medications:     Medication List      Started    doxycycline hyclate 100 MG capsule  Commonly known as:  VIBRAMYCIN  100 mg, Oral, 2 TIMES DAILY     predniSONE 20 MG tablet  Commonly known as:  DELTASONE  Take two tablets (= 40mg) each day for 10 (ten) days, 1 tablet (20 mg) daily for 5 days, 0.5 tablet (10 mg) for 5 days  Okay to substitute for 10 mg tablets          IDaren, am serving as a scribe on 6/13/2019 at 9:55 AM to personally document services performed by Daniel Isaac MD based on my observations and the provider's statements to me.     Daren Maki  6/13/2019    EMERGENCY DEPARTMENT       Daniel Isaac MD  06/14/19 0032

## 2019-07-03 ENCOUNTER — TRANSFERRED RECORDS (OUTPATIENT)
Dept: HEALTH INFORMATION MANAGEMENT | Facility: CLINIC | Age: 84
End: 2019-07-03

## 2019-09-06 DIAGNOSIS — I10 HYPERTENSION GOAL BP (BLOOD PRESSURE) < 140/90: ICD-10-CM

## 2019-09-06 NOTE — TELEPHONE ENCOUNTER
Requested Prescriptions   Pending Prescriptions Disp Refills     traMADol (ULTRAM) 50 MG tablet [Pharmacy Med Name: TRAMADOL 50MG TABLETS] 45 tablet 0     Sig: TAKE 1 TABLET BY MOUTH EVERY 8 HOURS AS NEEDED FOR PAIN       There is no refill protocol information for this order        Controlled Substance Refill Request for tramadol  Problem List Complete:  No     PROVIDER TO CONSIDER COMPLETION OF PROBLEM LIST AND OVERVIEW/CONTROLLED SUBSTANCE AGREEMENT    Last Written Prescription Date:  4/1/2019  Last Fill Quantity: 45,   # refills: 1    THE MOST RECENT OFFICE VISIT MUST BE WITHIN THE PAST 3 MONTHS. AT LEAST ONE FACE TO FACE VISIT MUST OCCUR EVERY 6 MONTHS. ADDITIONAL VISITS CAN BE VIRTUAL.  (THIS STATEMENT SHOULD BE DELETED.)    Last Office Visit with [facility] [place] care provider: 4/1/2019    Future Office visit:     Controlled substance agreement:   Encounter-Level CSA:    There are no encounter-level csa.     Patient-Level CSA:    There are no patient-level csa.         Last Urine Drug Screen: No results found for: CDAUT, No results found for: COMDAT, No results found for: THC13, PCP13, COC13, MAMP13, OPI13, AMP13, BZO13, TCA13, MTD13, BAR13, OXY13, PPX13, BUP13     Processing:  Patient will  in clinic     [email or web address]       checked in past 3 months?  No, route to RN         n/a

## 2019-09-06 NOTE — TELEPHONE ENCOUNTER
"Requested Prescriptions   Pending Prescriptions Disp Refills     aspirin (ASA) 81 MG EC tablet [Pharmacy Med Name: ASPIRIN 81MG EC LOW DOSE D/F TABS] 100 tablet 0     Sig: TAKE 1 TABLET BY MOUTH EVERY DAY       Analgesics (Non-Narcotic Tylenol and ASA Only) Passed - 9/6/2019 11:17 AM        Passed - Recent (12 mo) or future (30 days) visit within the authorizing provider's specialty     Patient had office visit in the last 12 months or has a visit in the next 30 days with authorizing provider or within the authorizing provider's specialty.  See \"Patient Info\" tab in inbasket, or \"Choose Columns\" in Meds & Orders section of the refill encounter.              Passed - Patient is age 20 years or older     If ASA is flagged for ages under 20 years old. Forward to provider for confirmation Ryes Syndrome is not a concern.              Passed - Medication is active on med list          Last Written Prescription Date:  4/1/19  Last Fill Quantity: 100,  # refills: 3   Last office visit: 4/1/2019 with prescribing provider:  Melonie   Future Office Visit:      "

## 2019-09-09 NOTE — TELEPHONE ENCOUNTER
Routing refill request to provider for review/approval because:  Drug not on the [facility] refill protocol     Michelle TERRY RN, BSN, PHN

## 2019-09-11 RX ORDER — TRAMADOL HYDROCHLORIDE 50 MG/1
TABLET ORAL
Qty: 45 TABLET | Refills: 0 | Status: SHIPPED | OUTPATIENT
Start: 2019-09-11

## 2019-11-19 ENCOUNTER — ANCILLARY PROCEDURE (OUTPATIENT)
Dept: GENERAL RADIOLOGY | Facility: CLINIC | Age: 84
End: 2019-11-19
Attending: FAMILY MEDICINE
Payer: COMMERCIAL

## 2019-11-19 ENCOUNTER — OFFICE VISIT (OUTPATIENT)
Dept: URGENT CARE | Facility: URGENT CARE | Age: 84
End: 2019-11-19
Payer: COMMERCIAL

## 2019-11-19 VITALS
BODY MASS INDEX: 25.71 KG/M2 | RESPIRATION RATE: 30 BRPM | DIASTOLIC BLOOD PRESSURE: 80 MMHG | SYSTOLIC BLOOD PRESSURE: 142 MMHG | WEIGHT: 123 LBS | HEART RATE: 68 BPM | TEMPERATURE: 97.5 F | OXYGEN SATURATION: 95 %

## 2019-11-19 DIAGNOSIS — R60.0 BILATERAL LOWER EXTREMITY EDEMA: Primary | ICD-10-CM

## 2019-11-19 LAB
ANION GAP SERPL CALCULATED.3IONS-SCNC: 3 MMOL/L (ref 3–14)
BASOPHILS # BLD AUTO: 0 10E9/L (ref 0–0.2)
BASOPHILS NFR BLD AUTO: 0.2 %
BUN SERPL-MCNC: 10 MG/DL (ref 7–30)
CALCIUM SERPL-MCNC: 9.7 MG/DL (ref 8.5–10.1)
CHLORIDE SERPL-SCNC: 111 MMOL/L (ref 94–109)
CO2 SERPL-SCNC: 27 MMOL/L (ref 20–32)
CREAT SERPL-MCNC: 0.74 MG/DL (ref 0.52–1.04)
DIFFERENTIAL METHOD BLD: ABNORMAL
EOSINOPHIL # BLD AUTO: 0.1 10E9/L (ref 0–0.7)
EOSINOPHIL NFR BLD AUTO: 2.2 %
ERYTHROCYTE [DISTWIDTH] IN BLOOD BY AUTOMATED COUNT: 12.6 % (ref 10–15)
GFR SERPL CREATININE-BSD FRML MDRD: 67 ML/MIN/{1.73_M2}
GLUCOSE SERPL-MCNC: 106 MG/DL (ref 70–99)
HCT VFR BLD AUTO: 36.8 % (ref 35–47)
HGB BLD-MCNC: 12.1 G/DL (ref 11.7–15.7)
LYMPHOCYTES # BLD AUTO: 0.7 10E9/L (ref 0.8–5.3)
LYMPHOCYTES NFR BLD AUTO: 16.6 %
MCH RBC QN AUTO: 32.9 PG (ref 26.5–33)
MCHC RBC AUTO-ENTMCNC: 32.9 G/DL (ref 31.5–36.5)
MCV RBC AUTO: 100 FL (ref 78–100)
MONOCYTES # BLD AUTO: 0.3 10E9/L (ref 0–1.3)
MONOCYTES NFR BLD AUTO: 6.3 %
NEUTROPHILS # BLD AUTO: 3.3 10E9/L (ref 1.6–8.3)
NEUTROPHILS NFR BLD AUTO: 74.7 %
PLATELET # BLD AUTO: 218 10E9/L (ref 150–450)
POTASSIUM SERPL-SCNC: 3.6 MMOL/L (ref 3.4–5.3)
RBC # BLD AUTO: 3.68 10E12/L (ref 3.8–5.2)
SODIUM SERPL-SCNC: 141 MMOL/L (ref 133–144)
TROPONIN I SERPL-MCNC: <0.015 UG/L (ref 0–0.04)
WBC # BLD AUTO: 4.5 10E9/L (ref 4–11)

## 2019-11-19 PROCEDURE — 85025 COMPLETE CBC W/AUTO DIFF WBC: CPT | Performed by: FAMILY MEDICINE

## 2019-11-19 PROCEDURE — 99203 OFFICE O/P NEW LOW 30 MIN: CPT | Performed by: FAMILY MEDICINE

## 2019-11-19 PROCEDURE — 71046 X-RAY EXAM CHEST 2 VIEWS: CPT

## 2019-11-19 PROCEDURE — 80048 BASIC METABOLIC PNL TOTAL CA: CPT | Performed by: FAMILY MEDICINE

## 2019-11-19 PROCEDURE — 93000 ELECTROCARDIOGRAM COMPLETE: CPT | Performed by: FAMILY MEDICINE

## 2019-11-19 PROCEDURE — 84484 ASSAY OF TROPONIN QUANT: CPT | Performed by: FAMILY MEDICINE

## 2019-11-19 PROCEDURE — 36415 COLL VENOUS BLD VENIPUNCTURE: CPT | Performed by: FAMILY MEDICINE

## 2019-11-19 NOTE — PROGRESS NOTES
SUBJECTIVE: Margot Kenyon is a 89 year old female presenting with a chief complaint of lower extremity edema.  Onset of symptoms was day(s) ago.  Course of illness is improving.    Severity mild  Current and Associated symptoms: none  Treatment measures tried include None tried.  Predisposing factors include see age and problem list.    Past Medical History:   Diagnosis Date     Cataract     [facility]     Diabetes (H)      HTN 1999     Osteoporosis     Completed at least 10 years of alendronate     Seasonal allergic rhinitis        Past Surgical History:   Procedure Laterality Date     NO HISTORY OF SURGERY         Family History   Family history unknown: Yes       Social History     Tobacco Use     Smoking status: Never Smoker     Smokeless tobacco: Never Used   Substance Use Topics     Alcohol use: No     Alcohol/week: 0.0 standard drinks      No Known Allergies    Review Of Systems  Skin: negative  Eyes: negative  Ears/Nose/Throat: negative  Respiratory: No shortness of breath, dyspnea on exertion, cough, or hemoptysis  Cardiovascular: negative  Gastrointestinal: negative  Genitourinary: negative  Musculoskeletal: negative  Neurologic: negative  Psychiatric: negative    OBJECTIVE:  BP (!) 142/80 (BP Location: Right arm, Patient Position: Sitting, Cuff Size: Adult Regular)   Pulse 68   Temp 97.5  F (36.4  C) (Oral)   Resp 30   Wt 55.8 kg (123 lb)   SpO2 95%   BMI 25.71 kg/m     GENERAL APPEARANCE: healthy, alert and no distress  EYES: EOMI,  PERRL, conjunctiva clear  HENT: ear canals and TM's normal.  Nose and mouth without ulcers, erythema or lesions  NECK: supple, nontender, no lymphadenopathy  RESP: lungs clear to auscultation - no rales, rhonchi or wheezes  CV: regular rates and rhythm, normal S1 S2, no murmur noted  ABDOMEN:  soft, nontender, no HSM or masses and bowel sounds normal  NEURO: Normal strength and tone, sensory exam grossly normal,  normal speech and mentation  SKIN: no suspicious  lesions or rashes  Mild pitting edema lower ext bilaterally    EKG NSR without acute st changes      ICD-10-CM    1. Bilateral lower extremity edema R60.0 CBC with platelets differential     Basic metabolic panel     Troponin I     EKG 12-lead complete w/read - Clinics     XR Chest 2 Views     No MI by normal EKG and Troponin  Edema improving on it's own.  Pt desires to not add hydrochlorothiazide for few days as long as improvement  Elevate legs, low salt diet and compression stocking with PCP f/u  Fluids/Rest, f/u if worse/not any better

## 2019-12-11 ENCOUNTER — OFFICE VISIT (OUTPATIENT)
Dept: INTERNAL MEDICINE | Facility: CLINIC | Age: 84
End: 2019-12-11
Payer: COMMERCIAL

## 2019-12-11 VITALS
TEMPERATURE: 97.7 F | WEIGHT: 119.1 LBS | SYSTOLIC BLOOD PRESSURE: 130 MMHG | RESPIRATION RATE: 16 BRPM | BODY MASS INDEX: 24.89 KG/M2 | HEART RATE: 80 BPM | DIASTOLIC BLOOD PRESSURE: 80 MMHG

## 2019-12-11 DIAGNOSIS — M79.89 LEG SWELLING: ICD-10-CM

## 2019-12-11 DIAGNOSIS — R21 RASH AND NONSPECIFIC SKIN ERUPTION: Primary | ICD-10-CM

## 2019-12-11 PROCEDURE — 99213 OFFICE O/P EST LOW 20 MIN: CPT | Performed by: INTERNAL MEDICINE

## 2019-12-11 RX ORDER — TRIAMCINOLONE ACETONIDE 0.25 MG/G
OINTMENT TOPICAL 2 TIMES DAILY
Qty: 454 G | Refills: 3 | Status: SHIPPED | OUTPATIENT
Start: 2019-12-11 | End: 2021-02-15

## 2019-12-11 NOTE — PROGRESS NOTES
Subjective     So GLO Kenyon is a 89 year old female who presents to clinic today for the following health issues:    HPI   ED/UC Followup:    Facility:  [facility]  Date of visit: 11/19/2019  Reason for visit: Leg swellling, Bilateral lower extremity edema  Current Status: No swelling but, still hurting (arthritis)        Patient here for follow-up recent urgent care visit.  She had presented with swelling in both of her legs on November 19.  Basic lab testing and physical exam at that visit was unremarkable, and the swelling has since resolved without specific intervention.    She has underlying arthritis of both knees, still has some pain with prolonged standing, usually well-controlled with current medications.    Current medication regimen includes amlodipine for hypertension.    Needs alternate prescription for [place] ointment, which is not preferred by insurance any longer.          Reviewed and updated as needed this visit by Provider  Tobacco  Allergies  Meds  Problems  Med Hx  Surg Hx  Fam Hx         Review of Systems   ROS COMP: Constitutional, HEENT, cardiovascular, pulmonary, GI, , musculoskeletal, neuro, skin, endocrine and psych systems are negative, except as otherwise noted.      Objective    /80 (BP Location: Left arm, Patient Position: Chair, Cuff Size: Adult Regular)   Pulse 80   Temp 97.7  F (36.5  C) (Oral)   Resp 16   Wt 54 kg (119 lb 1.6 oz)   PF 98 L/min   BMI 24.89 kg/m    Body mass index is 24.89 kg/m .  Physical Exam   GENERAL: healthy, alert and no distress  NECK: no adenopathy, no asymmetry, masses, or scars and thyroid normal to palpation  RESP: lungs clear to auscultation - no rales, rhonchi or wheezes  CV: regular rate and rhythm, normal S1 S2, no S3 or S4, no murmur, click or rub, no peripheral edema and peripheral pulses strong  ABDOMEN: soft, nontender, no hepatosplenomegaly, no masses and bowel sounds normal  MS: No swelling present today, some spider veins and  small varicosities visible in lower extremities.            Assessment & Plan     1. Rash and nonspecific skin eruption  Triamcinolone prescription written  - triamcinolone (KENALOG) 0.025 % external ointment; Apply topically 2 times daily [facility]  Dispense: 454 g; Refill: 3    2. Leg swelling  No specific abnormal physical exam findings today.  Suspect side effect of amlodipine, in context of underlying age-related venous insufficiency.  Reassurance.         See Patient Instructions    Return in about 4 weeks (around 1/8/2020) for recheck, if symptoms fail to improve.    Mario Wilhelm MD  [facility]

## 2020-02-18 ENCOUNTER — OFFICE VISIT (OUTPATIENT)
Dept: URGENT CARE | Facility: URGENT CARE | Age: 85
End: 2020-02-18
Payer: COMMERCIAL

## 2020-02-18 ENCOUNTER — HOSPITAL ENCOUNTER (EMERGENCY)
Facility: CLINIC | Age: 85
Discharge: HOME | End: 2020-02-18
Attending: EMERGENCY MEDICINE | Admitting: EMERGENCY MEDICINE
Payer: COMMERCIAL

## 2020-02-18 VITALS
OXYGEN SATURATION: 98 % | BODY MASS INDEX: 24.56 KG/M2 | HEIGHT: 58 IN | HEART RATE: 78 BPM | TEMPERATURE: 98.1 F | RESPIRATION RATE: 20 BRPM | WEIGHT: 117 LBS | SYSTOLIC BLOOD PRESSURE: 164 MMHG | DIASTOLIC BLOOD PRESSURE: 87 MMHG

## 2020-02-18 VITALS
OXYGEN SATURATION: 96 % | SYSTOLIC BLOOD PRESSURE: 146 MMHG | DIASTOLIC BLOOD PRESSURE: 60 MMHG | WEIGHT: 115 LBS | RESPIRATION RATE: 14 BRPM | TEMPERATURE: 97.6 F | BODY MASS INDEX: 24.04 KG/M2 | HEART RATE: 75 BPM

## 2020-02-18 DIAGNOSIS — R51.9 NONINTRACTABLE HEADACHE, UNSPECIFIED CHRONICITY PATTERN, UNSPECIFIED HEADACHE TYPE: ICD-10-CM

## 2020-02-18 DIAGNOSIS — R42 DIZZINESS: Primary | ICD-10-CM

## 2020-02-18 DIAGNOSIS — R42 VERTIGO: ICD-10-CM

## 2020-02-18 LAB
ALBUMIN UR-MCNC: NEGATIVE MG/DL
AMORPH CRY #/AREA URNS HPF: ABNORMAL /HPF
ANION GAP SERPL CALCULATED.3IONS-SCNC: 1 MMOL/L (ref 3–14)
APPEARANCE UR: ABNORMAL
BASOPHILS # BLD AUTO: 0 10E9/L (ref 0–0.2)
BASOPHILS NFR BLD AUTO: 0.2 %
BILIRUB UR QL STRIP: NEGATIVE
BUN SERPL-MCNC: 10 MG/DL (ref 7–30)
CALCIUM SERPL-MCNC: 10.5 MG/DL (ref 8.5–10.1)
CHLORIDE SERPL-SCNC: 111 MMOL/L (ref 94–109)
CO2 SERPL-SCNC: 32 MMOL/L (ref 20–32)
COLOR UR AUTO: YELLOW
CREAT SERPL-MCNC: 0.73 MG/DL (ref 0.52–1.04)
DIFFERENTIAL METHOD BLD: ABNORMAL
EOSINOPHIL # BLD AUTO: 0.1 10E9/L (ref 0–0.7)
EOSINOPHIL NFR BLD AUTO: 2.2 %
ERYTHROCYTE [DISTWIDTH] IN BLOOD BY AUTOMATED COUNT: 13.8 % (ref 10–15)
GFR SERPL CREATININE-BSD FRML MDRD: 68 ML/MIN/{1.73_M2}
GLUCOSE SERPL-MCNC: 102 MG/DL (ref 70–99)
GLUCOSE UR STRIP-MCNC: NEGATIVE MG/DL
HCT VFR BLD AUTO: 36.6 % (ref 35–47)
HGB BLD-MCNC: 12 G/DL (ref 11.7–15.7)
HGB UR QL STRIP: NEGATIVE
IMM GRANULOCYTES # BLD: 0 10E9/L (ref 0–0.4)
IMM GRANULOCYTES NFR BLD: 0.2 %
KETONES UR STRIP-MCNC: NEGATIVE MG/DL
LEUKOCYTE ESTERASE UR QL STRIP: ABNORMAL
LYMPHOCYTES # BLD AUTO: 0.8 10E9/L (ref 0.8–5.3)
LYMPHOCYTES NFR BLD AUTO: 16.9 %
MCH RBC QN AUTO: 32.9 PG (ref 26.5–33)
MCHC RBC AUTO-ENTMCNC: 32.8 G/DL (ref 31.5–36.5)
MCV RBC AUTO: 100 FL (ref 78–100)
MONOCYTES # BLD AUTO: 0.3 10E9/L (ref 0–1.3)
MONOCYTES NFR BLD AUTO: 5.8 %
NEUTROPHILS # BLD AUTO: 3.7 10E9/L (ref 1.6–8.3)
NEUTROPHILS NFR BLD AUTO: 74.7 %
NITRATE UR QL: NEGATIVE
NRBC # BLD AUTO: 0 10*3/UL
NRBC BLD AUTO-RTO: 0 /100
PH UR STRIP: 7.5 PH (ref 5–7)
PLATELET # BLD AUTO: 221 10E9/L (ref 150–450)
POTASSIUM SERPL-SCNC: 3.6 MMOL/L (ref 3.4–5.3)
RBC # BLD AUTO: 3.65 10E12/L (ref 3.8–5.2)
RBC #/AREA URNS AUTO: 0 /HPF (ref 0–2)
SODIUM SERPL-SCNC: 144 MMOL/L (ref 133–144)
SOURCE: ABNORMAL
SP GR UR STRIP: 1.01 (ref 1–1.03)
SQUAMOUS #/AREA URNS AUTO: 1 /HPF (ref 0–1)
UROBILINOGEN UR STRIP-MCNC: NORMAL MG/DL (ref 0–2)
WBC # BLD AUTO: 5 10E9/L (ref 4–11)
WBC #/AREA URNS AUTO: 2 /HPF (ref 0–5)

## 2020-02-18 PROCEDURE — 99214 OFFICE O/P EST MOD 30 MIN: CPT | Performed by: FAMILY MEDICINE

## 2020-02-18 PROCEDURE — 85025 COMPLETE CBC W/AUTO DIFF WBC: CPT | Performed by: EMERGENCY MEDICINE

## 2020-02-18 PROCEDURE — 81001 URINALYSIS AUTO W/SCOPE: CPT | Performed by: EMERGENCY MEDICINE

## 2020-02-18 PROCEDURE — 99283 EMERGENCY DEPT VISIT LOW MDM: CPT

## 2020-02-18 PROCEDURE — 80048 BASIC METABOLIC PNL TOTAL CA: CPT | Performed by: EMERGENCY MEDICINE

## 2020-02-18 PROCEDURE — 25000132 ZZH RX MED GY IP 250 OP 250 PS 637: Performed by: EMERGENCY MEDICINE

## 2020-02-18 RX ORDER — MECLIZINE HYDROCHLORIDE 25 MG/1
25 TABLET ORAL ONCE
Status: COMPLETED | OUTPATIENT
Start: 2020-02-18 | End: 2020-02-18

## 2020-02-18 RX ORDER — MECLIZINE HYDROCHLORIDE 25 MG/1
25 TABLET ORAL 3 TIMES DAILY PRN
Qty: 15 TABLET | Refills: 0 | Status: SHIPPED | OUTPATIENT
Start: 2020-02-18

## 2020-02-18 RX ORDER — ONDANSETRON 4 MG/1
4 TABLET, ORALLY DISINTEGRATING ORAL ONCE
Status: DISCONTINUED | OUTPATIENT
Start: 2020-02-18 | End: 2020-02-18 | Stop reason: HOSPADM

## 2020-02-18 RX ORDER — ONDANSETRON 4 MG/1
4 TABLET, ORALLY DISINTEGRATING ORAL EVERY 8 HOURS PRN
Qty: 9 TABLET | Refills: 0 | Status: SHIPPED | OUTPATIENT
Start: 2020-02-18 | End: 2020-02-21

## 2020-02-18 RX ADMIN — MECLIZINE HYDROCHLORIDE 25 MG: 25 TABLET ORAL at 14:36

## 2020-02-18 ASSESSMENT — ENCOUNTER SYMPTOMS
NAUSEA: 0
HEADACHES: 0
TROUBLE SWALLOWING: 0
VOMITING: 0
DIZZINESS: 1
NECK PAIN: 0

## 2020-02-18 ASSESSMENT — MIFFLIN-ST. JEOR: SCORE: 795.46

## 2020-02-18 NOTE — ED TRIAGE NOTES
"Dizziness \"spinning\" for several days. No HA or vomiting. Seen at urgent care and told to come to ED.  "

## 2020-02-18 NOTE — PROGRESS NOTES
SUBJECTIVE: Margot Kenyon is a 89 year old female presenting with a chief complaint of dizziness at rest with ha.  Onset of symptoms was 2 day(s) ago.  Course of illness is worsening.    Severity moderate  Current and Associated symptoms: nione  Treatment measures tried include None tried.  Predisposing factors include see problem list.    Past Medical History:   Diagnosis Date     Cataract     [facility]     Diabetes (H)      HTN 1999     Osteoporosis     Completed at least 10 years of alendronate     Seasonal allergic rhinitis      No Known Allergies  Social History     Tobacco Use     Smoking status: Never Smoker     Smokeless tobacco: Never Used   Substance Use Topics     Alcohol use: No     Alcohol/week: 0.0 standard drinks       ROS:  SKIN: no rash  GI: no vomiting    OBJECTIVE:  BP (!) 146/60 (BP Location: Right arm, Patient Position: Chair, Cuff Size: Adult Regular)   Pulse 75   Temp 97.6  F (36.4  C) (Oral)   Resp 14   Wt 52.2 kg (115 lb)   SpO2 96%   BMI 24.04 kg/m  GENERAL APPEARANCE: healthy, alert and no distress  EYES: EOMI,  PERRL, conjunctiva clear  HENT: ear canals and TM's normal.  Nose and mouth without ulcers, erythema or lesions  NECK: supple, nontender, no lymphadenopathy  RESP: lungs clear to auscultation - no rales, rhonchi or wheezes  CV: regular rates and rhythm, normal S1 S2, no murmur noted  ABDOMEN:  soft, nontender, no HSM or masses and bowel sounds normal  NEURO: Normal strength and tone, sensory exam grossly normal,  normal speech and mentation  SKIN: no suspicious lesions or rashes      ICD-10-CM    1. Dizziness R42    2. Nonintractable headache, unspecified chronicity pattern, unspecified headache type R51      Pt will do her w/u at ED for dizziness at rest with ha

## 2020-02-18 NOTE — ED NOTES
Pt walked about 15 feet independently with a walker and SBA.   Denies any dizziness/ vertigo upon standing and while walking. Gait steady while walking.  MD updated.  Mikala Burgos RN,.......................................... 2/18/2020   3:37 PM

## 2020-02-18 NOTE — ED NOTES
Assumed care at this time.    Pt's son reports flu like symptoms a week ago. Denies any dizziness or HA at this time.    Mikala Burgos RN,.......................................... 2/18/2020   3:15 PM

## 2020-02-18 NOTE — ED PROVIDER NOTES
"  History     Chief Complaint:  Dizziness    The history is provided by the patient and a relative. No  was used (Son translated).      Margot Kenyon is a 89 year old female who presents with her son for dizziness. The patient describes occasional episodes of spinning dizziness that begin when she closes her eyes. These occur even when laying down though she cannot identify the duration. She has been able to walk with her cane the same as when at baseline and has not had falls due to her dizziness. The patient's dizziness has not reoccurred today though she additionally identifies occasional \"ear scratching\" and having a slight cough a couple weeks ago. She denies headache, blurred vision, numbness, nausea, vomiting, neck pain, or abnormal difficulty swallowing.     Allergies:  No known drug allergies.       Medications:    Norvasc  Aspirin  Capsaicin   Celebrex   Vitron C   Flonase  hydrochlorothiazide   Claritin   Cozaar  prednisone   Ultram  Kenalog     Past Medical History:    Diabetes  Hypertension  Osteoporosis   Seasonal allergies   Cataracts     Past Surgical History:    Past surgical history reviewed. No pertinent past surgical history.     Family History:    Family history reviewed. No pertinent family history.     Social History:  The patient was accompanied to the ED by her son.  Smoking Status: Never Smoker  Smokeless Tobacco: Never Used  Alcohol Use: Negative   Drug Use: Negative  PCP: Mario Wilhelm    Marital Status:  Legally      Review of Systems   HENT: Negative for trouble swallowing.    Eyes: Negative for visual disturbance.   Gastrointestinal: Negative for nausea and vomiting.   Musculoskeletal: Negative for neck pain.   Neurological: Positive for dizziness. Negative for headaches.   All other systems reviewed and are negative.      Physical Exam     Patient Vitals for the past 24 hrs:   BP Temp Temp src Pulse Heart Rate Resp SpO2 Height Weight   02/18/20 1612 (!) " "164/87 -- -- -- 79 -- 98 % -- --   02/18/20 1533 (!) 164/87 -- -- 78 -- -- -- -- --   02/18/20 1529 -- -- -- -- -- -- 97 % -- --   02/18/20 1528 (!) 172/82 -- -- 77 -- -- -- -- --   02/18/20 1400 (!) 175/83 -- -- 73 -- -- 99 % -- --   02/18/20 1117 (!) 175/73 98.1  F (36.7  C) Oral -- 79 20 98 % 1.473 m (4' 10\") 53.1 kg (117 lb)      Physical Exam  Physical Exam   Constitutional:  Patient is alert. They appear well-developed and well-nourished.   HENT:   Mouth/Throat:   Oropharynx is clear and moist.   Eyes:    Conjunctivae normal and EOM are normal. Pupils are equal, round, and reactive to light. no nystagmus.  Neck:    Normal range of motion.   Cardiovascular: Normal rate, regular rhythm and normal heart sounds.  Exam reveals no gallop and no friction rub.  No murmur heard.  Pulmonary/Chest:  Effort normal and breath sounds normal. Patient has no wheezes. Patient has no rales.   Abdominal:   Soft. Bowel sounds are normal. Patient exhibits no mass. There is no tenderness. There is no rebound and no guarding.   Musculoskeletal:  Normal range of motion. Patient exhibits no edema.   Neurological:   Patient is alert . Patient has normal strength. No cranial nerve deficit or sensory deficit. GCS 15  Skin:   Skin is warm and dry. No rash noted. No erythema.   Psychiatric:   Patient has a normal mood    Emergency Department Course     Laboratory:  Laboratory findings were communicated with the patient who voiced understanding of the findings.    CBC: WBC 5.0, HGB 12.0,    BMP: chloride 111 (H), anion gap 1 (L), glucose 102 (H), calcium 10.5 (H) o/w WNL (Creatinine 0.73)    UA with microscopic: pH 7.5 (H), small leukocyte esterase, few amorphous crystals, o/w WNL     Interventions:  1436 Antivert 25 mg PO     Emergency Department Course:  Past medical records, nursing notes, and vitals reviewed.    1513 I performed an exam of the patient as documented above.     IV was inserted and blood was drawn for laboratory " testing, results above.  The patient provided a urine sample here in the emergency department. This was sent for laboratory testing, findings above.    1603 Patient rechecked and updated.      I personally reviewed the laboratory results with the Patient and son and answered all related questions prior to discharge. I prescribed meclizine and Zofran.      Impression & Plan     Medical Decision Making:  Margot Kenyon is a 89 year old female presenting with dizziness. She denied any dizziness to me at this time. It sounds like it happened yesterday. It's worse with laying down, not worse with walking. She had no focal neurologic deficits. The paperwork she brought from urgent care said she had a headache, but I specifically asked and she has no headache, no neck pain. Basic blood work was obtained and shows no acute metabolic derangement. Urinalysis is negative. She has no evidence of acute anemia or leukocytosis. I spoke with the son who lives with her and helps with her medical decision making. Discussed MRI but based on this patient's age, it was deemed that we would not do this, which I think is reasonable. I think this is very unlikely to be a posterior stroke. I will write her for meclizine. She'll follow-up with her primary care provider. At this time she remains asymptomatic and got up to go to the bathroom with assistance, which is her typical, without any difficulty.     Discharge Diagnosis:    ICD-10-CM    1. Vertigo R42      Disposition:  Discharged to home     Discharge Medications:  Discharge Medication List as of 2/18/2020  4:02 PM      START taking these medications    Details   meclizine 25 MG PO tablet Take 1 tablet (25 mg) by mouth 3 times daily as needed for dizziness, Disp-15 tablet, R-0, E-Prescribe      ondansetron (ZOFRAN ODT) 4 MG PO ODT tab Take 1 tablet (4 mg) by mouth every 8 hours as needed, Disp-9 tablet, R-0, E-Prescribe             Scribe Disclosure:  I, Rossi Randolph, am serving as a scribe  at 3:12 PM on 2/18/2020 to document services personally performed by Leslie Joiner MD based on my observations and the provider's statements to me.       Leslie Joiner MD  02/21/20 1529

## 2020-02-18 NOTE — ED AVS SNAPSHOT
Emergency Department  [address] [facility] [address]  Phone:  [phone]  Fax:  [phone]                                    Margot Kenyon   MRN: [id number]    Department:   Emergency Department   Date of Visit:  2/18/2020           After Visit Summary Signature Page    I have received my discharge instructions, and my questions have been answered. I have discussed any challenges I see with this plan with the nurse or doctor.    ..........................................................................................................................................  Patient/Patient Representative Signature      ..........................................................................................................................................  Patient Representative Print Name and Relationship to Patient    ..................................................               ................................................  Date                                   Time    ..........................................................................................................................................  Reviewed by Signature/Title    ...................................................              ..............................................  Date                                               Time          22EPIC Rev 08/18

## 2020-07-03 DIAGNOSIS — D50.9 IRON DEFICIENCY ANEMIA, UNSPECIFIED IRON DEFICIENCY ANEMIA TYPE: ICD-10-CM

## 2020-07-03 DIAGNOSIS — I10 ESSENTIAL HYPERTENSION: ICD-10-CM

## 2020-07-03 DIAGNOSIS — M25.50 PAIN IN JOINT, MULTIPLE SITES: ICD-10-CM

## 2020-07-03 RX ORDER — ASPIRIN 81 MG/1
TABLET, COATED ORAL
Qty: 100 TABLET | Refills: 1 | Status: SHIPPED | OUTPATIENT
Start: 2020-07-03

## 2020-07-03 RX ORDER — AMLODIPINE BESYLATE 5 MG/1
TABLET ORAL
Qty: 90 TABLET | Refills: 3 | Status: SHIPPED | OUTPATIENT
Start: 2020-07-03

## 2020-07-03 RX ORDER — PSEUDOEPHED/ACETAMINOPH/DIPHEN 30MG-500MG
TABLET ORAL
Qty: 100 TABLET | Refills: 1 | Status: SHIPPED | OUTPATIENT
Start: 2020-07-03 | End: 2021-02-15

## 2020-07-03 RX ORDER — BACILLUS COAGULANS 1B CELL
CAPSULE ORAL
Qty: 90 TABLET | Refills: 3 | Status: SHIPPED | OUTPATIENT
Start: 2020-07-03

## 2020-07-03 NOTE — TELEPHONE ENCOUNTER
Routing refill request to provider for review/approval because:  Drug not on the [facility] refill protocol   Labs out of range:  Elevated BP    BP Readings from Last 3 Encounters:   02/18/20 (!) 164/87   02/18/20 (!) 146/60   12/11/19 130/80

## 2021-02-12 DIAGNOSIS — R21 RASH AND NONSPECIFIC SKIN ERUPTION: ICD-10-CM

## 2021-02-12 DIAGNOSIS — M25.50 PAIN IN JOINT, MULTIPLE SITES: ICD-10-CM

## 2021-02-15 DIAGNOSIS — R21 RASH AND NONSPECIFIC SKIN ERUPTION: ICD-10-CM

## 2021-02-15 RX ORDER — TRIAMCINOLONE ACETONIDE 0.25 MG/G
OINTMENT TOPICAL
Qty: 454 G | Refills: 3 | Status: SHIPPED | OUTPATIENT
Start: 2021-02-15

## 2021-02-15 RX ORDER — PSEUDOEPHED/ACETAMINOPH/DIPHEN 30MG-500MG
TABLET ORAL
Qty: 100 TABLET | Refills: 1 | Status: SHIPPED | OUTPATIENT
Start: 2021-02-15

## 2021-02-16 RX ORDER — TRIAMCINOLONE ACETONIDE 0.25 MG/G
OINTMENT TOPICAL
Qty: 454 G | Refills: 3 | OUTPATIENT
Start: 2021-02-16

## 2021-05-27 DIAGNOSIS — I10 HYPERTENSION GOAL BP (BLOOD PRESSURE) < 140/90: ICD-10-CM

## 2021-05-27 NOTE — TELEPHONE ENCOUNTER
Routing refill request to provider for review/approval because:  Patient needs to be seen because it has been more than 1 year since last office visit.      Filomena KIMBALL RN, BSN, PHN

## 2021-06-08 ENCOUNTER — TELEPHONE (OUTPATIENT)
Dept: INTERNAL MEDICINE | Facility: CLINIC | Age: 86
End: 2021-06-08

## 2021-06-08 NOTE — TELEPHONE ENCOUNTER
Release of information received and faxed to [facility] to send records to [facility].

## 2021-07-07 ENCOUNTER — TELEPHONE (OUTPATIENT)
Dept: INTERNAL MEDICINE | Facility: CLINIC | Age: 86
End: 2021-07-07

## 2021-09-13 ENCOUNTER — MEDICAL CORRESPONDENCE (OUTPATIENT)
Dept: HEALTH INFORMATION MANAGEMENT | Facility: CLINIC | Age: 86
End: 2021-09-13

## 2021-10-08 ENCOUNTER — TELEPHONE (OUTPATIENT)
Dept: INTERNAL MEDICINE | Facility: CLINIC | Age: 86
End: 2021-10-08

## 2021-10-08 NOTE — TELEPHONE ENCOUNTER
Call from [facility]. Reviewed medications and medical history. She reports death on 10/3/2021 at 15:39.    Routing to PCP as FYI and to care team.